# Patient Record
Sex: FEMALE | Race: WHITE | Employment: FULL TIME | URBAN - NONMETROPOLITAN AREA
[De-identification: names, ages, dates, MRNs, and addresses within clinical notes are randomized per-mention and may not be internally consistent; named-entity substitution may affect disease eponyms.]

---

## 2020-02-27 ENCOUNTER — APPOINTMENT (OUTPATIENT)
Dept: CT IMAGING | Age: 50
DRG: 201 | End: 2020-02-27
Attending: INTERNAL MEDICINE

## 2020-02-27 ENCOUNTER — HOSPITAL ENCOUNTER (INPATIENT)
Age: 50
LOS: 6 days | Discharge: HOME | DRG: 201 | End: 2020-03-04
Attending: INTERNAL MEDICINE | Admitting: INTERNAL MEDICINE

## 2020-02-27 PROBLEM — J93.83 SPONTANEOUS PNEUMOTHORAX: Status: ACTIVE | Noted: 2020-02-27

## 2020-02-27 LAB
MRSA SCREEN RT-PCR: POSITIVE
VANCOMYCIN RESISTANT ENTEROCOCCUS: NEGATIVE

## 2020-02-27 PROCEDURE — 6370000000 HC RX 637 (ALT 250 FOR IP): Performed by: NURSE PRACTITIONER

## 2020-02-27 PROCEDURE — 6360000002 HC RX W HCPCS: Performed by: NURSE PRACTITIONER

## 2020-02-27 PROCEDURE — 2709999900 HC NON-CHARGEABLE SUPPLY

## 2020-02-27 PROCEDURE — 99223 1ST HOSP IP/OBS HIGH 75: CPT | Performed by: NURSE PRACTITIONER

## 2020-02-27 PROCEDURE — APPSS60 APP SPLIT SHARED TIME 46-60 MINUTES: Performed by: PHYSICIAN ASSISTANT

## 2020-02-27 PROCEDURE — 87081 CULTURE SCREEN ONLY: CPT

## 2020-02-27 PROCEDURE — 2060000000 HC ICU INTERMEDIATE R&B

## 2020-02-27 PROCEDURE — 87500 VANOMYCIN DNA AMP PROBE: CPT

## 2020-02-27 PROCEDURE — 0W9930Z DRAINAGE OF RIGHT PLEURAL CAVITY WITH DRAINAGE DEVICE, PERCUTANEOUS APPROACH: ICD-10-PCS | Performed by: INTERNAL MEDICINE

## 2020-02-27 PROCEDURE — 71250 CT THORAX DX C-: CPT

## 2020-02-27 PROCEDURE — 2580000003 HC RX 258: Performed by: NURSE PRACTITIONER

## 2020-02-27 PROCEDURE — 94760 N-INVAS EAR/PLS OXIMETRY 1: CPT

## 2020-02-27 PROCEDURE — 87641 MR-STAPH DNA AMP PROBE: CPT

## 2020-02-27 RX ORDER — MAGNESIUM SULFATE IN WATER 40 MG/ML
2 INJECTION, SOLUTION INTRAVENOUS PRN
Status: DISCONTINUED | OUTPATIENT
Start: 2020-02-27 | End: 2020-03-04 | Stop reason: HOSPADM

## 2020-02-27 RX ORDER — SODIUM CHLORIDE 0.9 % (FLUSH) 0.9 %
10 SYRINGE (ML) INJECTION PRN
Status: DISCONTINUED | OUTPATIENT
Start: 2020-02-27 | End: 2020-03-04 | Stop reason: HOSPADM

## 2020-02-27 RX ORDER — POTASSIUM CHLORIDE 20 MEQ/1
40 TABLET, EXTENDED RELEASE ORAL PRN
Status: DISCONTINUED | OUTPATIENT
Start: 2020-02-27 | End: 2020-03-04 | Stop reason: HOSPADM

## 2020-02-27 RX ORDER — PROMETHAZINE HYDROCHLORIDE 25 MG/1
12.5 TABLET ORAL EVERY 6 HOURS PRN
Status: DISCONTINUED | OUTPATIENT
Start: 2020-02-27 | End: 2020-03-04 | Stop reason: HOSPADM

## 2020-02-27 RX ORDER — NICOTINE 21 MG/24HR
1 PATCH, TRANSDERMAL 24 HOURS TRANSDERMAL DAILY
Status: DISCONTINUED | OUTPATIENT
Start: 2020-02-27 | End: 2020-03-04 | Stop reason: HOSPADM

## 2020-02-27 RX ORDER — MULTIVITAMIN WITH IRON
100 TABLET ORAL DAILY
COMMUNITY

## 2020-02-27 RX ORDER — OXYCODONE HYDROCHLORIDE AND ACETAMINOPHEN 5; 325 MG/1; MG/1
2 TABLET ORAL EVERY 4 HOURS PRN
Status: DISCONTINUED | OUTPATIENT
Start: 2020-02-27 | End: 2020-03-04 | Stop reason: HOSPADM

## 2020-02-27 RX ORDER — POTASSIUM CHLORIDE 7.45 MG/ML
10 INJECTION INTRAVENOUS PRN
Status: DISCONTINUED | OUTPATIENT
Start: 2020-02-27 | End: 2020-03-04 | Stop reason: HOSPADM

## 2020-02-27 RX ORDER — ACETAMINOPHEN 650 MG/1
650 SUPPOSITORY RECTAL EVERY 6 HOURS PRN
Status: DISCONTINUED | OUTPATIENT
Start: 2020-02-27 | End: 2020-03-04 | Stop reason: HOSPADM

## 2020-02-27 RX ORDER — SODIUM CHLORIDE 0.9 % (FLUSH) 0.9 %
10 SYRINGE (ML) INJECTION EVERY 12 HOURS SCHEDULED
Status: DISCONTINUED | OUTPATIENT
Start: 2020-02-27 | End: 2020-03-04 | Stop reason: HOSPADM

## 2020-02-27 RX ORDER — SODIUM CHLORIDE 9 MG/ML
INJECTION, SOLUTION INTRAVENOUS CONTINUOUS
Status: DISCONTINUED | OUTPATIENT
Start: 2020-02-27 | End: 2020-02-29

## 2020-02-27 RX ORDER — OXYCODONE HYDROCHLORIDE AND ACETAMINOPHEN 5; 325 MG/1; MG/1
1 TABLET ORAL EVERY 4 HOURS PRN
Status: DISCONTINUED | OUTPATIENT
Start: 2020-02-27 | End: 2020-03-04 | Stop reason: HOSPADM

## 2020-02-27 RX ORDER — ACETAMINOPHEN 325 MG/1
650 TABLET ORAL EVERY 6 HOURS PRN
Status: DISCONTINUED | OUTPATIENT
Start: 2020-02-27 | End: 2020-03-04 | Stop reason: HOSPADM

## 2020-02-27 RX ORDER — ONDANSETRON 2 MG/ML
4 INJECTION INTRAMUSCULAR; INTRAVENOUS EVERY 6 HOURS PRN
Status: DISCONTINUED | OUTPATIENT
Start: 2020-02-27 | End: 2020-03-04 | Stop reason: HOSPADM

## 2020-02-27 RX ADMIN — HYDROMORPHONE HYDROCHLORIDE 0.5 MG: 1 INJECTION, SOLUTION INTRAMUSCULAR; INTRAVENOUS; SUBCUTANEOUS at 17:26

## 2020-02-27 RX ADMIN — Medication 10 ML: at 10:20

## 2020-02-27 RX ADMIN — HYDROMORPHONE HYDROCHLORIDE 0.5 MG: 1 INJECTION, SOLUTION INTRAMUSCULAR; INTRAVENOUS; SUBCUTANEOUS at 10:09

## 2020-02-27 RX ADMIN — HYDROMORPHONE HYDROCHLORIDE 0.5 MG: 1 INJECTION, SOLUTION INTRAMUSCULAR; INTRAVENOUS; SUBCUTANEOUS at 13:30

## 2020-02-27 RX ADMIN — OXYCODONE HYDROCHLORIDE AND ACETAMINOPHEN 2 TABLET: 5; 325 TABLET ORAL at 11:20

## 2020-02-27 RX ADMIN — OXYCODONE HYDROCHLORIDE AND ACETAMINOPHEN 2 TABLET: 5; 325 TABLET ORAL at 15:55

## 2020-02-27 RX ADMIN — HYDROMORPHONE HYDROCHLORIDE 0.25 MG: 1 INJECTION, SOLUTION INTRAMUSCULAR; INTRAVENOUS; SUBCUTANEOUS at 20:38

## 2020-02-27 SDOH — HEALTH STABILITY: MENTAL HEALTH: HOW OFTEN DO YOU HAVE A DRINK CONTAINING ALCOHOL?: NEVER

## 2020-02-27 ASSESSMENT — PAIN DESCRIPTION - ORIENTATION
ORIENTATION: RIGHT;MID

## 2020-02-27 ASSESSMENT — PAIN DESCRIPTION - PROGRESSION
CLINICAL_PROGRESSION: NOT CHANGED
CLINICAL_PROGRESSION: GRADUALLY IMPROVING

## 2020-02-27 ASSESSMENT — PAIN SCALES - GENERAL
PAINLEVEL_OUTOF10: 6
PAINLEVEL_OUTOF10: 7
PAINLEVEL_OUTOF10: 6
PAINLEVEL_OUTOF10: 7
PAINLEVEL_OUTOF10: 10
PAINLEVEL_OUTOF10: 5
PAINLEVEL_OUTOF10: 8
PAINLEVEL_OUTOF10: 10
PAINLEVEL_OUTOF10: 8
PAINLEVEL_OUTOF10: 9
PAINLEVEL_OUTOF10: 7

## 2020-02-27 ASSESSMENT — PAIN - FUNCTIONAL ASSESSMENT
PAIN_FUNCTIONAL_ASSESSMENT: ACTIVITIES ARE NOT PREVENTED
PAIN_FUNCTIONAL_ASSESSMENT: PREVENTS OR INTERFERES SOME ACTIVE ACTIVITIES AND ADLS
PAIN_FUNCTIONAL_ASSESSMENT: PREVENTS OR INTERFERES SOME ACTIVE ACTIVITIES AND ADLS
PAIN_FUNCTIONAL_ASSESSMENT: INTOLERABLE, UNABLE TO DO ANY ACTIVE OR PASSIVE ACTIVITIES

## 2020-02-27 ASSESSMENT — PAIN DESCRIPTION - DESCRIPTORS
DESCRIPTORS: CONSTANT;PRESSURE
DESCRIPTORS: CONSTANT
DESCRIPTORS: CONSTANT;PRESSURE
DESCRIPTORS: CONSTANT;ACHING;PRESSURE

## 2020-02-27 ASSESSMENT — PAIN DESCRIPTION - PAIN TYPE
TYPE: ACUTE PAIN

## 2020-02-27 ASSESSMENT — PAIN DESCRIPTION - FREQUENCY
FREQUENCY: CONTINUOUS

## 2020-02-27 ASSESSMENT — PAIN DESCRIPTION - LOCATION
LOCATION: CHEST

## 2020-02-27 ASSESSMENT — PAIN DESCRIPTION - ONSET
ONSET: SUDDEN
ONSET: ON-GOING

## 2020-02-27 NOTE — PROGRESS NOTES
Patient looking forward to eating a meal on a general food diet.  Blayne Orr, [facility], SN.

## 2020-02-27 NOTE — PROGRESS NOTES
Pharmacy Medication History Note      List of current medications patient is taking is complete. Source of information: patient    Changes made to medication list:  Medications removed (include reason, ex. therapy complete or physician discontinued): None     Medications added/doses adjusted: Added vitamin B-6 100 mg, patient states she takes 1 tablet daily     Other notes (ex. Recent course of antibiotics, Coumadin dosing):  Patient states she does not take any medications at home except for the vitamin B-6  Denies use of other OTC or herbal medications.       Allergies reviewed      Electronically signed by Yoan Bravo on 2/27/2020 at 9:48 AM

## 2020-02-27 NOTE — LETTER
[facility] STEPDOWN TELEMETRY 4K  [address] [facility] [address]  Phone: [phone]             March 4, 2020    Patient: Jermaine Simon   YOB: 1970   Date of Visit: 2/27/2020       To Whom It May Concern:    Jermaine Simon was seen and treated in our facility  beginning 2/27/2020 until 3/4/2020.        Sincerely,       Ladonna Tan RN         Signature:__________________________________

## 2020-02-27 NOTE — CARE COORDINATION
2/27/20, 3:23 PM  DISCHARGE PLANNING EVALUATION:    Catia Jalloh       Admitted from: [facility] 2/27/2020/ [address] day: 0   Location: [facility]09/009-A Reason for admit: Spontaneous pneumothorax [J93.83] Status: IP  Admit order signed?: yes  PMH:  has a past medical history of Asthma, Disease of blood and blood forming organ, History of blood transfusion, and Ptosis. Procedure:   2/27 CT Chest  1. Large bore chest tube right side. Small less than 5% pneumothorax right side. 2. Extensive pneumomediastinum. Relatively severe subcutaneous emphysema on the right side of the chest laterally extending anteriorly and posteriorly. Mild bibasilar atelectasis/pneumonia. 3. Note that the large bore chest tube appears to extend into the lung parenchyma posteriorly with some mild adjacent atelectasis or possibly tiny amount of associated focal hemorrhage along the tube tract. Medications:  Scheduled Meds:   sodium chloride flush  10 mL Intravenous 2 times per day    nicotine  1 patch Transdermal Daily     Continuous Infusions:   sodium chloride 50 mL/hr at 02/27/20 1021      Pertinent Info/Orders/Treatment Plan:  Client admitted with spontaneous pneumothorax, treated with IVF, Oxygen 1L; CTS following  Diet: DIET CLEAR LIQUID;   Smoking status:  reports that she quit smoking about 3 weeks ago. Her smoking use included cigarettes. She has a 32.00 pack-year smoking history. She does not have any smokeless tobacco history on file.    PCP: Renetta Haney MD  Readmission 30 days or less: none  Readmission Risk Score: 6%    Discharge Planning Evaluation  Current Residence:  Private Residence  Living Arrangements:  Family Members   Support Systems:  Children, Family Members  Current Services PTA:     Potential Assistance Needed:  N/A  Potential Assistance Purchasing Medications:  No  Does patient want to participate in local refill/ meds to beds program?  No  Type of Home Care Services:  None  Patient expects to be

## 2020-02-27 NOTE — DISCHARGE SUMMARY
CT/CV Surgery Consult Note    2020 10:33 AM  Surgeon:  Dr. Susana Lawson     Reason for Consult: Pneumothorax    CC:   SOB    HPI:    Ms. Patsy Delarosa  is a 47year old female who presents from [facility] for evaluation and treatment of right sided spontaneous pneumothorax. She has a hx of 2 spontaneous pneumothorax within the last month. She currently is complaining of right sided chest pain uncontrolled with Dilaudid. She currently tells me her breathing is stable and unchanged since this AM. She stopped smoking last about 3 weeks ago. Prior to that she was smoking a pack a day. Vital Signs: /79   Pulse 72   Temp 97.8 °F (36.6 °C) (Oral)   Resp 18   Ht 5' 2\" (1.575 m)   SpO2 97%    Temp (24hrs), Av.8 °F (36.6 °C), Min:97.8 °F (36.6 °C), Max:97.8 °F (36.6 °C)      PULSE OXIMETRY RANGE: SpO2  Av %  Min: 97 %  Max: 97 %    SUPPLEMENTAL O2: O2 Flow Rate (L/min): 2 L/min     Labs:   CBC:   No results for input(s): WBC, HGB, HCT, MCV, PLT, APTT, PROTIME, INR in the last 72 hours. BMP: No results for input(s): NA, K, CL, CO2, PHOS, BUN, CREATININE, MG, POCGLU in the last 72 hours. Invalid input(s): CA  Last HgA1C: No results found for: LABA1C    Imaging:  CXR: I have reviewed the CXR image. Patient Name: Marjorie Kimball MR #: [id number]   YOB: 1970 Gender: F   Service Type: ER Pt. Type: E   Ordering Phys: Toyin Albarran Account #: [de-identified]   Admitting Phys: Accession #: [id number]   Family Phys:    Additional CC Phys:       MEDICAL IMAGING REPORT           EXAM: CHEST AP or PA PORTABLE     Exam Date: 2020 Exam Time: 05:55:19     CLINICAL HISTORY: chest tube placement     [facility] ACQUIRED HISTORY: chest tube insertion     EXAMINATION:   ONE XRAY VIEW OF THE CHEST     2020 5:55 am     COMPARISON:   Same day chest radiograph from 2 hours prior     HISTORY:   Reason for exam: : chest tube placement Note: 2 - TECH HISTORY chest tube   insertion     FINDINGS:   Status post right chest tube placement with persistent right-sided   pneumothorax. Direct size comparison is limited given supine positioning. Slight increase in the previously seen right neck and chest wall subcutaneous   emphysema. No focal consolidation or pleural effusion. The cardiac   silhouette and osseous structures are stable. IMPRESSION:   1. Persistent right-sided pneumothorax status post chest tube placement. 2. Increased right neck and chest wall subcutaneous emphysema. D/T: 2/27/2020 6:20:31 AM / /     Interpreting Provider:   Electronically signed by August Polacno MD on 2/27/2020 6:29:14 AM     Patient: Danilo Torres MRN: [id number] Date of Service: 2/27/2020        CT chest w/o IV contrast: I have reviewed the CT image. 02/13   Patient Name: Cas Fierro MR #: [id number]   YOB: 1970 Gender: Female   Service Type: MEDICAL GENERAL Pt. Type: In Patient   Ordering Phys: Francheska Nelson DO Account #: [de-identified]   Admitting Phys: Accession #: [id number]   Family Phys: Additional CC Phys:   MEDICAL IMAGING REPORT       EXAM: CT CHEST W/ IV CONTRAST   Exam Date: 02/13/2020 Exam Time: 10:48 AM   CLINICAL HISTORY: Pneumothorax, crepitus sp chest tube   [facility] ACQUIRED HISTORY: had rt side spontaneous pneumo, rt sided chest tube has been removed, subcutaneous air to face and now left side of body   COMPARISON: 2-   TECHNIQUE: CT of the chest was performed with administration of intravenous contrast. Multiplanar reformatted images are provided for review. This exam was performed according to our department dose optimization protocol, which includes automated exposure control, adjustment of the mA and/or kV according to patient size and/or use of iterative reconstruction technique. DLP: 117     FINDINGS:     A very small tiny pneumothorax amount to less than 5% involving the right lung base along the anterior aspect noted. No shift of midline.      The curvilinear lucency in the right apex noted on the chest radiographs appears to be related to a superimposed density rather than a pneumothorax in the right apex. No shift of mediastinum. Extensive subcutaneous emphysema again noted. Mild pneumomediastinum also noted, unchanged since 2-. OPTIRAY 320 100ML was used with 75 ml injected and 25 ml discarded. [employer] Tech ID: [id number]   DLP : 117   Transcribed by: [id number] Transcription Date: 02/13/2020 Transcription Time: 11:35 AM   Dictated by: Vijay Suarez, Dictation Date: 02/13/2020 Dictation Time: 10:55 AM   Electronically Signed by: Vijay Suarez on 02/13/2020 at 12:56 PM            Scheduled Meds:    sodium chloride flush  10 mL Intravenous 2 times per day    nicotine  1 patch Transdermal Daily         PastMedical History:  Mago Thurman  has no past medical history on file. Past Surgical History:  The patient  has no past surgical history on file. Allergies: The patient has no allergies on file. Family History: This patient's family history is not on file. Social History:  Mago Thurman      ROS:  Constitutional: Negative for activity change, chills, fatigue, fever and unexpected weight change. HENT: Negative for congestion, facial swelling, sore throat, and changes in voice. Eyes: Negative for photophobia, redness, itching and visual disturbance. Respiratory: Positive for SOB   Cardiovascular: Positive for chest pain. Negative for  palpitations and leg swelling. Gastrointestinal: Negative for abdominal distention, constipation, nausea and vomiting. Endocrine: Negative for cold intolerance, heat intolerance, polyphagia and polyuria. Musculoskeletal: Negative for arthralgias, gait problem, and myalgias. Skin: Negative for color change, rash and wound. Allergic/Immunologic: Negative for food allergies and immunocompromised state. Neurological: Negative for dizziness, tremors, speech difficulty, weakness, numbness and headaches.    Hematological: Negative for adenopathy. Does not bruise/bleed easily. Psychiatric/Behavioral: Negative for agitation, confusion, and dysphoric mood. Physical Exam:   General appearance:  No apparent distress, appears stated age and cooperative. HEENT:  Normal cephalic, atraumatic without obvious deformity. Conjunctivae/corneas clear. Neck: Supple, with full range of motion. No jugular venous distention. Trachea midline. Respiratory:  Diminished lung sounds on the right. Cardiovascular:  Regular rate and rhythm with normal S1/S2 without murmurs, rubs or gallops. Abdomen: Soft, non-tender, non-distended with normal bowel sounds. Musculoskeletal:  No clubbing, cyanosis or edema bilaterally. Full range of motion without deformity. Skin: Skin color, texture, turgor normal.  No rashes or lesions. Neurologic:  Neurovascularly intact without any focal sensory/motor deficits. Psychiatric:  Alert and oriented, thought content appropriate, normal insight. Active Problem List  Patient Active Problem List   Diagnosis    Spontaneous pneumothorax       Assessment:   Recurrent right sided spontaneous pneumothorax    Plan: 2/27/20  1. CT chest wo contrast  2. Chest tube to suction    The plan of care was discussed in detail with Dr. Beatriz cardoso     Issues discussed in detail with the patient, who understands and has no further questions. Time spent with patient: 80 minutes, of which more than 50% was spent counseling/coordinating the patient's care.     Alysia Friend PA-C

## 2020-02-27 NOTE — PROGRESS NOTES
Patient is a 47year old patient from [facility] ED with Dr Blaze Barraza transferring. Patient has a history of 2 spontaneous pneumothorax. Patient arrived with c/o Right chest wall and Right scapular pain, in no distress. CXR shows moderate pneumothorax . Extensive subcutaneous emphysema right chest, right scapula and right arm. Dr Blaze Barraza placed a chest tube to water seal. Everything appears to be working properly with the chest tube, however 2nd CXR showed a persistent pneumo, no tension. Dr Blaze Barraza states that it may possibly be a loculated area that may need drained since it still shows persistent pneumothorax after CT placement. Besides severe pain, the patient is in no distress. VSS as follows: /74, temp 97F, HR 93, RR 18 and is 100% oxygen saturations on room air. Dr Carol Robles consulted and asked for hospitalist to admit. Patient will go to 4K12 under Dr Bob Craven with diagnosis of Spontaneous Pneumothorax.

## 2020-02-27 NOTE — CONSULTS
CT/CV Surgery Consult Note    2020 12:26 PM  Surgeon:  Dr. Beatriz cardoso     Reason for Consult: Pneumothorax    CC:   SOB    HPI:    Ms. Veronica Pollard  is a 47year old female who presents from [facility] for evaluation and treatment of right sided spontaneous pneumothorax. She has a hx of 2 spontaneous pneumothorax within the last month. She currently is complaining of right sided chest pain uncontrolled with Dilaudid. She currently tells me her breathing is stable and unchanged since this AM. She stopped smoking last about 3 weeks ago. Prior to that she was smoking a pack a day. Vital Signs: /78   Pulse 70   Temp 97.5 °F (36.4 °C) (Oral)   Resp 18   Ht 5' 2\" (1.575 m)   SpO2 99%    Temp (24hrs), Av.7 °F (36.5 °C), Min:97.5 °F (36.4 °C), Max:97.8 °F (36.6 °C)      PULSE OXIMETRY RANGE: SpO2  Av %  Min: 97 %  Max: 99 %    SUPPLEMENTAL O2: O2 Flow Rate (L/min): 1 L/min     Labs:   CBC:   No results for input(s): WBC, HGB, HCT, MCV, PLT, APTT, PROTIME, INR in the last 72 hours. BMP: No results for input(s): NA, K, CL, CO2, PHOS, BUN, CREATININE, MG, POCGLU in the last 72 hours. Invalid input(s): CA  Last HgA1C: No results found for: LABA1C    Imaging:  CXR: I have reviewed the CXR image. Patient Name: Taryn Koenig MR #: [id number]   YOB: 1970 Gender: F   Service Type: ER Pt. Type: E   Ordering Phys: Fred Jason Account #: [de-identified]   Admitting Phys: Accession #: [id number]   Family Phys:    Additional CC Phys:       MEDICAL IMAGING REPORT           EXAM: CHEST AP or PA PORTABLE     Exam Date: 2020 Exam Time: 05:55:19     CLINICAL HISTORY: chest tube placement     [facility] ACQUIRED HISTORY: chest tube insertion     EXAMINATION:   ONE XRAY VIEW OF THE CHEST     2020 5:55 am     COMPARISON:   Same day chest radiograph from 2 hours prior     HISTORY:   Reason for exam: : chest tube placement Note: 2 - TECH HISTORY chest tube   insertion     FINDINGS:   Status post right chest tube placement with persistent right-sided   pneumothorax. Direct size comparison is limited given supine positioning. Slight increase in the previously seen right neck and chest wall subcutaneous   emphysema. No focal consolidation or pleural effusion. The cardiac   silhouette and osseous structures are stable. IMPRESSION:   1. Persistent right-sided pneumothorax status post chest tube placement. 2. Increased right neck and chest wall subcutaneous emphysema. D/T: 2/27/2020 6:20:31 AM / /     Interpreting Provider:   Electronically signed by August Polanco MD on 2/27/2020 6:29:14 AM     Patient: Danilo Torres MRN: [id number] Date of Service: 2/27/2020        CT chest w/o IV contrast: I have reviewed the CT image. 02/13   Patient Name: Cas Fierro MR #: [id number]   YOB: 1970 Gender: Female   Service Type: MEDICAL GENERAL Pt. Type: In Patient   Ordering Phys: Francheska Nelson DO Account #: [de-identified]   Admitting Phys: Accession #: [id number]   Family Phys: Additional CC Phys:   MEDICAL IMAGING REPORT       EXAM: CT CHEST W/ IV CONTRAST   Exam Date: 02/13/2020 Exam Time: 10:48 AM   CLINICAL HISTORY: Pneumothorax, crepitus sp chest tube   [facility] ACQUIRED HISTORY: had rt side spontaneous pneumo, rt sided chest tube has been removed, subcutaneous air to face and now left side of body   COMPARISON: 2-   TECHNIQUE: CT of the chest was performed with administration of intravenous contrast. Multiplanar reformatted images are provided for review. This exam was performed according to our department dose optimization protocol, which includes automated exposure control, adjustment of the mA and/or kV according to patient size and/or use of iterative reconstruction technique. DLP: 117     FINDINGS:     A very small tiny pneumothorax amount to less than 5% involving the right lung base along the anterior aspect noted. No shift of midline.      The curvilinear lucency in the right apex noted on the chest radiographs appears to be related to a superimposed density rather than a pneumothorax in the right apex. No shift of mediastinum. Extensive subcutaneous emphysema again noted. Mild pneumomediastinum also noted, unchanged since 2-. OPTIRAY 320 100ML was used with 75 ml injected and 25 ml discarded. Performing Tech ID: [id number]   DLP : 117   Transcribed by: [id number] Transcription Date: 02/13/2020 Transcription Time: 11:35 AM   Dictated by: Andra Fraser, Dictation Date: 02/13/2020 Dictation Time: 10:55 AM   Electronically Signed by: Andra Fraser on 02/13/2020 at 12:56 PM            Scheduled Meds:    sodium chloride flush  10 mL Intravenous 2 times per day    nicotine  1 patch Transdermal Daily         PastMedical History:  Delaine Bence  has no past medical history on file. Past Surgical History:  The patient  has no past surgical history on file. Allergies: The patient has no allergies on file. Family History: This patient's family history is not on file. Social History:  Delaine Bence      ROS:  Constitutional: Negative for activity change, chills, fatigue, fever and unexpected weight change. HENT: Negative for congestion, facial swelling, sore throat, and changes in voice. Eyes: Negative for photophobia, redness, itching and visual disturbance. Respiratory: Positive for SOB   Cardiovascular: Positive for chest pain. Negative for  palpitations and leg swelling. Gastrointestinal: Negative for abdominal distention, constipation, nausea and vomiting. Endocrine: Negative for cold intolerance, heat intolerance, polyphagia and polyuria. Musculoskeletal: Negative for arthralgias, gait problem, and myalgias. Skin: Negative for color change, rash and wound. Allergic/Immunologic: Negative for food allergies and immunocompromised state. Neurological: Negative for dizziness, tremors, speech difficulty, weakness, numbness and headaches.    Hematological: Negative for

## 2020-02-27 NOTE — H&P
information on record. Social History:   Smokes 1 pack/day for a long time and stopped 3 weeks ago    Family History:      Positive as follows: Patient denies any significant medical history    REVIEW OF SYSTEMS:     Constitutional: ROS: negative for - chills or fever  Head: no headache, no head injury, no migraine   Eyes ROS: denies blurred/double vision  Ears ROS: no hearing difficulty, no tinnitus  Mouth and Throat ROS: no ulceration, dysphagia, dental caries  Psychological ROS: no depression, no anxiety, no panic attacks, denies suicide/homicide ideation  Endocrine ROS: denies polyuria, polydypsia, no heat or cold intolerance  Respiratory ROS: positive for - cough, pleuritic pain and shortness of breath  Cardiovascular ROS: positive for - chest pain and shortness of breath  Gastrointestinal ROS: no abdominal pain, change in bowel habits, or black or bloody stools  Genito-Urinary ROS: denies dysuria, frequency, urgency; denies hematuria  Musculoskeletal ROS: negative  Neurological ROS: no syncope, no seizures, no numbness or tingling of hands, no numbness or tingling of feet, no paresis  Dermatology: no skin rash, no eczema  Endocrine: no polyuria, polydypsia, no heat/cold intolerance  Hematology: denies bruising easily, denies bleeding problems, denies clotting disorders    PHYSICAL EXAM:    /79   Pulse 72   Temp 97.8 °F (36.6 °C) (Oral)   Resp 18   Ht 5' 2\" (1.575 m)   SpO2 97%     General appearance:  No apparent distress, appears stated age and cooperative. HEENT:  Normal cephalic, atraumatic without obvious deformity. Pupils equal, round, and reactive to light. Conjunctivae/corneas clear. Neck: Supple, with full range of motion. No jugular venous distention. Trachea midline. Respiratory:  Normal respiratory effort. Diminished to auscultation right side,  bilaterally without Rales/Wheezes/Rhonchi.   Cardiovascular:  Regular rate and rhythm with normal S1/S2 without murmurs, rubs or

## 2020-02-28 ENCOUNTER — APPOINTMENT (OUTPATIENT)
Dept: GENERAL RADIOLOGY | Age: 50
DRG: 201 | End: 2020-02-28
Attending: INTERNAL MEDICINE

## 2020-02-28 LAB
ANION GAP SERPL CALCULATED.3IONS-SCNC: 8 MEQ/L (ref 8–16)
BASOPHILS # BLD: 0.1 %
BASOPHILS ABSOLUTE: 0 THOU/MM3 (ref 0–0.1)
BUN BLDV-MCNC: 13 MG/DL (ref 7–22)
CALCIUM SERPL-MCNC: 8.2 MG/DL (ref 8.5–10.5)
CHLORIDE BLD-SCNC: 108 MEQ/L (ref 98–111)
CO2: 23 MEQ/L (ref 23–33)
CREAT SERPL-MCNC: 0.4 MG/DL (ref 0.4–1.2)
EOSINOPHIL # BLD: 2.8 %
EOSINOPHILS ABSOLUTE: 0.2 THOU/MM3 (ref 0–0.4)
ERYTHROCYTE [DISTWIDTH] IN BLOOD BY AUTOMATED COUNT: 14.7 % (ref 11.5–14.5)
ERYTHROCYTE [DISTWIDTH] IN BLOOD BY AUTOMATED COUNT: 55.5 FL (ref 35–45)
GFR SERPL CREATININE-BSD FRML MDRD: > 90 ML/MIN/1.73M2
GLUCOSE BLD-MCNC: 121 MG/DL (ref 70–108)
HCT VFR BLD CALC: 33.4 % (ref 37–47)
HEMOGLOBIN: 10.2 GM/DL (ref 12–16)
IMMATURE GRANS (ABS): 0.04 THOU/MM3 (ref 0–0.07)
IMMATURE GRANULOCYTES: 0.6 %
LYMPHOCYTES # BLD: 12 %
LYMPHOCYTES ABSOLUTE: 0.9 THOU/MM3 (ref 1–4.8)
MAGNESIUM: 1.8 MG/DL (ref 1.6–2.4)
MCH RBC QN AUTO: 31.3 PG (ref 26–33)
MCHC RBC AUTO-ENTMCNC: 30.5 GM/DL (ref 32.2–35.5)
MCV RBC AUTO: 102.5 FL (ref 81–99)
MONOCYTES # BLD: 2.5 %
MONOCYTES ABSOLUTE: 0.2 THOU/MM3 (ref 0.4–1.3)
NUCLEATED RED BLOOD CELLS: 0 /100 WBC
PLATELET # BLD: 267 THOU/MM3 (ref 130–400)
PMV BLD AUTO: 8.8 FL (ref 9.4–12.4)
POTASSIUM REFLEX MAGNESIUM: 3.5 MEQ/L (ref 3.5–5.2)
RBC # BLD: 3.26 MILL/MM3 (ref 4.2–5.4)
SEG NEUTROPHILS: 82 %
SEGMENTED NEUTROPHILS ABSOLUTE COUNT: 6 THOU/MM3 (ref 1.8–7.7)
SODIUM BLD-SCNC: 139 MEQ/L (ref 135–145)
WBC # BLD: 7.3 THOU/MM3 (ref 4.8–10.8)

## 2020-02-28 PROCEDURE — 71045 X-RAY EXAM CHEST 1 VIEW: CPT

## 2020-02-28 PROCEDURE — 6370000000 HC RX 637 (ALT 250 FOR IP): Performed by: NURSE PRACTITIONER

## 2020-02-28 PROCEDURE — APPSS30 APP SPLIT SHARED TIME 16-30 MINUTES: Performed by: PHYSICIAN ASSISTANT

## 2020-02-28 PROCEDURE — 80048 BASIC METABOLIC PNL TOTAL CA: CPT

## 2020-02-28 PROCEDURE — 6360000002 HC RX W HCPCS: Performed by: NURSE PRACTITIONER

## 2020-02-28 PROCEDURE — 83735 ASSAY OF MAGNESIUM: CPT

## 2020-02-28 PROCEDURE — 99232 SBSQ HOSP IP/OBS MODERATE 35: CPT | Performed by: NURSE PRACTITIONER

## 2020-02-28 PROCEDURE — 2060000000 HC ICU INTERMEDIATE R&B

## 2020-02-28 PROCEDURE — 85025 COMPLETE CBC W/AUTO DIFF WBC: CPT

## 2020-02-28 PROCEDURE — 2709999900 HC NON-CHARGEABLE SUPPLY

## 2020-02-28 PROCEDURE — 36415 COLL VENOUS BLD VENIPUNCTURE: CPT

## 2020-02-28 RX ADMIN — POTASSIUM CHLORIDE 40 MEQ: 1500 TABLET, EXTENDED RELEASE ORAL at 07:09

## 2020-02-28 RX ADMIN — OXYCODONE HYDROCHLORIDE AND ACETAMINOPHEN 2 TABLET: 5; 325 TABLET ORAL at 19:54

## 2020-02-28 RX ADMIN — HYDROMORPHONE HYDROCHLORIDE 0.5 MG: 1 INJECTION, SOLUTION INTRAMUSCULAR; INTRAVENOUS; SUBCUTANEOUS at 21:42

## 2020-02-28 RX ADMIN — HYDROMORPHONE HYDROCHLORIDE 0.25 MG: 1 INJECTION, SOLUTION INTRAMUSCULAR; INTRAVENOUS; SUBCUTANEOUS at 01:15

## 2020-02-28 RX ADMIN — HYDROMORPHONE HYDROCHLORIDE 0.5 MG: 1 INJECTION, SOLUTION INTRAMUSCULAR; INTRAVENOUS; SUBCUTANEOUS at 05:07

## 2020-02-28 RX ADMIN — OXYCODONE HYDROCHLORIDE AND ACETAMINOPHEN 1 TABLET: 5; 325 TABLET ORAL at 07:11

## 2020-02-28 RX ADMIN — OXYCODONE HYDROCHLORIDE AND ACETAMINOPHEN 2 TABLET: 5; 325 TABLET ORAL at 13:26

## 2020-02-28 RX ADMIN — OXYCODONE HYDROCHLORIDE AND ACETAMINOPHEN 2 TABLET: 5; 325 TABLET ORAL at 02:30

## 2020-02-28 RX ADMIN — HYDROMORPHONE HYDROCHLORIDE 0.5 MG: 1 INJECTION, SOLUTION INTRAMUSCULAR; INTRAVENOUS; SUBCUTANEOUS at 14:45

## 2020-02-28 ASSESSMENT — PAIN DESCRIPTION - FREQUENCY
FREQUENCY: CONTINUOUS

## 2020-02-28 ASSESSMENT — PAIN SCALES - GENERAL
PAINLEVEL_OUTOF10: 10
PAINLEVEL_OUTOF10: 0
PAINLEVEL_OUTOF10: 0
PAINLEVEL_OUTOF10: 6
PAINLEVEL_OUTOF10: 7
PAINLEVEL_OUTOF10: 8
PAINLEVEL_OUTOF10: 7
PAINLEVEL_OUTOF10: 9
PAINLEVEL_OUTOF10: 0
PAINLEVEL_OUTOF10: 5
PAINLEVEL_OUTOF10: 9
PAINLEVEL_OUTOF10: 10

## 2020-02-28 ASSESSMENT — PAIN DESCRIPTION - PAIN TYPE
TYPE: ACUTE PAIN

## 2020-02-28 ASSESSMENT — PAIN DESCRIPTION - PROGRESSION
CLINICAL_PROGRESSION: NOT CHANGED

## 2020-02-28 ASSESSMENT — PAIN DESCRIPTION - ONSET
ONSET: ON-GOING

## 2020-02-28 ASSESSMENT — PAIN DESCRIPTION - DESCRIPTORS
DESCRIPTORS: ACHING

## 2020-02-28 ASSESSMENT — PAIN DESCRIPTION - LOCATION
LOCATION: CHEST

## 2020-02-28 ASSESSMENT — PAIN DESCRIPTION - ORIENTATION
ORIENTATION: RIGHT

## 2020-02-28 ASSESSMENT — PAIN - FUNCTIONAL ASSESSMENT
PAIN_FUNCTIONAL_ASSESSMENT: PREVENTS OR INTERFERES SOME ACTIVE ACTIVITIES AND ADLS
PAIN_FUNCTIONAL_ASSESSMENT: PREVENTS OR INTERFERES SOME ACTIVE ACTIVITIES AND ADLS

## 2020-02-28 NOTE — PROGRESS NOTES
Hospitalist Progress Note      Patient:  Amol Rangel    Unit/Bed:4K-09/009-A  YOB: 1970  MRN: [id number]   Acct: [de-identified]   PCP: Tom Elliott MD  Date of Admission: 2/27/2020    Assessment/Plan:    1. Spontaneous large right pneumothorax: Chest tube was placed at [facility].  CXR on 2/28 reports no acute pneumothorax with coarse lung markings and prominent subcutaneous emphysema along the right thorax. Continue chest tube management repeat CXR in the morning. CT surgery following, appreciate assistance. 2. Extensive subcutaneous emphysema: CT of the chest performed on 2/27 reports relatively severe subcutaneous emphysema on the right side of the chest laterally extending anteriorly and posteriorly. Continue chest tube management, repeat CXR in the morning. CT surgery following. 3. Hypokalemia: Resolved. Current potassium 3.5.  4. Acute macrocytic anemia: Current H&H 10.2/33.4 with an MCV of 102. 5. Probably secondary to above problems. No overt signs of bleeding at this time. Continue to monitor for changes, recheck CBC vitamin B12, and folate in the morning. 5. Remote smoker: Patient reports quitting smoking approximately 3 weeks ago. She was educated on the importance of smoking cessation and was receptive to the education. Currently denies NRT. Chief Complaint: Right-sided chest pain    Initial H and P:-    Amol Rangel is a 50-year-old  female, reformed smoker, with a medical history of asthma and ptosis. Patient presented to [facility] with complaints of right-sided chest pain after she was initially seen at [facility] this morning. She was initially seen at [address] [place] [address] for right-sided chest pain two spontaneous pneumothorax 2 weeks ago.   Patient reported that she had a chest tube at that time and presented today with complaints of right-sided chest

## 2020-02-28 NOTE — FLOWSHEET NOTE
02/28/20 0403   Provider Notification   Reason for Communication Evaluate   Provider Name Jose Maria Stallings   Provider Notification Physician Assistant   Method of Communication Secure Message   Response Waiting for response   Notification Time [address] [facility] notified that patient is still unable to take deep breaths and has 10/10 pain. Respiratory assessment remains unchanged. Patient states \"this is my third chest tube, I know this is not right.  Something is changed, I think my lung is collapsing again\"

## 2020-02-28 NOTE — PROGRESS NOTES
9717 - pt complaining that she \"can't take a deep breath\" after getting up to the bedside commode. CT site remains clean, dry, intact. No air leak, crepitus still present. Lung sounds diminished but heard throughout. Chest rise symmetrical with no tracheal deviation. Pt does not appear in any respiratory distress. RR 18-20. SpO2 96% on 1L NC. Complains of 10/10 pain at R CT site. Tender on palpation. Pain medication administered at this time. Pt is extremely anxious. Pt instructed to call with any increased shortness of breath or pain. Will continue to monitor.

## 2020-02-28 NOTE — PLAN OF CARE
Problem: Pain:  Goal: Pain level will decrease  Description  Pain level will decrease  Outcome: Ongoing  Note:   Pain Assessment: 0-10  Pain Level: 6   Patient's Stated Pain Goal: 5   Is pain goal met at this time? No     Non-Pharmaceutical Pain Intervention(s): Repositioned, Rest    Goal: Control of acute pain  Description  Control of acute pain  Outcome: Ongoing  Goal: Control of chronic pain  Description  Control of chronic pain  Outcome: Ongoing     Problem: Infection:  Goal: Will remain free from infection  Description  Will remain free from infection  Outcome: Ongoing  Note:   Patient shows no signs of infection this shift. Problem: Daily Care:  Goal: Daily care needs are met  Description  Daily care needs are met  Outcome: Ongoing  Note:   Patient is refusing to move and not wanting to get up and ambulate. Needs encouragement to participate in care. Problem: Skin Integrity:  Goal: Skin integrity will stabilize  Description  Skin integrity will stabilize  Outcome: Ongoing  Note:   No noted skin issues. Patient does have a right chest tube to suction. Dressing ARTEMIO. Problem: Discharge Planning:  Goal: Patients continuum of care needs are met  Description  Patients continuum of care needs are met  Outcome: Ongoing  Note:   No discharge plans this shift. Care plan reviewed with patient. Patient verbalize understanding of the plan of care and contribute to goal setting.

## 2020-02-28 NOTE — CARE COORDINATION
2/28/20, 11:27 AM    DISCHARGE ON [address] [employer] [place] day: 1  Location: [facility]09/009- Reason for admit: Spontaneous pneumothorax [J93.83]   Procedure:   2/27 CT Chest  1. Large bore chest tube right side. Small less than 5% pneumothorax right side. 2. Extensive pneumomediastinum. Relatively severe subcutaneous emphysema on the right side of the chest laterally extending anteriorly and posteriorly. Mild bibasilar atelectasis/pneumonia. 3. Note that the large bore chest tube appears to extend into the lung parenchyma posteriorly with some mild adjacent atelectasis or possibly tiny amount of associated focal hemorrhage along the tube tract. 2/28 CXR  1. Visualized right-sided chest tube. No acute pneumothorax. 2. Coarse lung markings. 3. Prominent subcutaneous emphysema along the right thorax.    Treatment Plan of Care: CTS following, right chest tube output = 112 ml/24h; monitor  Barriers to Discharge: plans home with family  PCP: Oralia Gaucher, MD  Readmission Risk Score: 8%  Patient Goals/Plan/Treatment Preferences: met with client, denied needs as plans home with daughter Merissa Nelson as PTA

## 2020-02-28 NOTE — PROGRESS NOTES
CT/CV Surgery Progress Note    2020 8:47 AM  Surgeon:  Dr. Charisse Hdz     Subjective:  Ms. Jena Cruz is resting comfortably in bed on 1L NC O2 sat 96%. She is alert, and in no acute distress. Chest tube placed for spontaneous pneumothorax with history of 2 previous spontaneous pneumothorax. Chest tube output: 25cc past shift, 112cc 24 hours. No air leak. Pt denies chest pressure, SOB, fever,chills, N/V/D. Vital Signs: /61   Pulse 64   Temp 97.7 °F (36.5 °C) (Oral)   Resp 18   Ht 5' 2\" (1.575 m)   Wt 120 lb (54.4 kg)   SpO2 96%   BMI 21.95 kg/m²    Temp (24hrs), Av.7 °F (36.5 °C), Min:97.5 °F (36.4 °C), Max:97.8 °F (36.6 °C)      PULSE OXIMETRY RANGE: SpO2  Av.2 %  Min: 96 %  Max: 99 %     Labs:   CBC:     Recent Labs     20  0611   WBC 7.3   HGB 10.2*   HCT 33.4*   .5*        BMP:   Recent Labs     20  0611      K 3.5      CO2 23   BUN 13   CREATININE 0.4   MG 1.8     Last HgA1C: No results found for: LABA1C    Imaging:  CXR: I have reviewed the CXR image. 1. Visualized right-sided chest tube. No acute pneumothorax. 2. Coarse lung markings. 3. Prominent subcutaneous emphysema along the right thorax. Intake/Output Summary (Last 24 hours) at 2020 0800  Last data filed at 2020 0513  Gross per 24 hour   Intake 1346.86 ml   Output 1212 ml   Net 134.86 ml       Scheduled Meds:    sodium chloride flush  10 mL Intravenous 2 times per day    nicotine  1 patch Transdermal Daily       ROS: All neg unless specifically mentioned in subjective section.      Exam:  General Appearance: alert ,conversing, in no acute distress  Cardiovascular: normal rate, regular rhythm, normal S1 and S2, no murmurs, rubs, clicks, or gallops  Pulmonary/Chest: Subcutaneous emphysema right chest.  Lungs clear to auscultation bilaterally- no wheezes, rales or rhonchi, normal air movement, no respiratory distress  Neurological: alert, oriented, normal speech, no focal findings or movement disorder noted      Assessment:   Patient Active Problem List   Diagnosis    Spontaneous pneumothorax       Plan:   1. CXR reviewed- Continue daily CXR's   2. Continue chest tube management.     The plan of care was discussed in detail with CLINTON Salinas

## 2020-02-28 NOTE — PROGRESS NOTES
Reassessment  Neurological  2-1mm PERRL bilaterally  Oriented and Alert x3  Negative arm drift  Pedal push and push equal and strong bilaterally  Finger Squeeze equal and strong bilaterally    GI  Bowels heard and active x4  Abdomen nontender, nondistended    Cardiovascular  Capillary refill >3 bilaterally  Skin warm, dry, and appropriate for ethnicity  No peripheral edema present  Pedal pulses regular +2 bilaterally  Apical pulse regular +2    Lung sounds regular and clear  No laborious breathing   Patient states it is \"getting easier to take deep breaths. \"  Subcutaneous emphysema noted on right anterior shoulder extending down to right antecubital.     Slight bruising near chest tube site on mid back. Patient refuses to get up. Anxiety observed regarding her chest tube potentially coming dislodged. Patient also encouraged to drink fluids. A handout regarding MRSA patient education was given. She had questions and felt anxious about being tested postive for MRSA.  Sara Meléndez, [facility], .

## 2020-02-29 ENCOUNTER — APPOINTMENT (OUTPATIENT)
Dept: GENERAL RADIOLOGY | Age: 50
DRG: 201 | End: 2020-02-29
Attending: INTERNAL MEDICINE

## 2020-02-29 LAB
ANION GAP SERPL CALCULATED.3IONS-SCNC: 7 MEQ/L (ref 8–16)
BUN BLDV-MCNC: 15 MG/DL (ref 7–22)
CALCIUM SERPL-MCNC: 8.3 MG/DL (ref 8.5–10.5)
CHLORIDE BLD-SCNC: 108 MEQ/L (ref 98–111)
CO2: 25 MEQ/L (ref 23–33)
CREAT SERPL-MCNC: 0.4 MG/DL (ref 0.4–1.2)
ERYTHROCYTE [DISTWIDTH] IN BLOOD BY AUTOMATED COUNT: 14.8 % (ref 11.5–14.5)
ERYTHROCYTE [DISTWIDTH] IN BLOOD BY AUTOMATED COUNT: 55.7 FL (ref 35–45)
FOLATE: 4.3 NG/ML (ref 4.8–24.2)
GFR SERPL CREATININE-BSD FRML MDRD: > 90 ML/MIN/1.73M2
GLUCOSE BLD-MCNC: 94 MG/DL (ref 70–108)
HCT VFR BLD CALC: 33.3 % (ref 37–47)
HEMOGLOBIN: 10.2 GM/DL (ref 12–16)
MAGNESIUM: 1.8 MG/DL (ref 1.6–2.4)
MCH RBC QN AUTO: 31.4 PG (ref 26–33)
MCHC RBC AUTO-ENTMCNC: 30.6 GM/DL (ref 32.2–35.5)
MCV RBC AUTO: 102.5 FL (ref 81–99)
MRSA SCREEN: NORMAL
PLATELET # BLD: 294 THOU/MM3 (ref 130–400)
PMV BLD AUTO: 8.8 FL (ref 9.4–12.4)
POTASSIUM SERPL-SCNC: 4.2 MEQ/L (ref 3.5–5.2)
RBC # BLD: 3.25 MILL/MM3 (ref 4.2–5.4)
SODIUM BLD-SCNC: 140 MEQ/L (ref 135–145)
VITAMIN B-12: 247 PG/ML (ref 211–911)
WBC # BLD: 7.2 THOU/MM3 (ref 4.8–10.8)

## 2020-02-29 PROCEDURE — 6360000002 HC RX W HCPCS: Performed by: NURSE PRACTITIONER

## 2020-02-29 PROCEDURE — 2060000000 HC ICU INTERMEDIATE R&B

## 2020-02-29 PROCEDURE — 80048 BASIC METABOLIC PNL TOTAL CA: CPT

## 2020-02-29 PROCEDURE — 2709999900 HC NON-CHARGEABLE SUPPLY

## 2020-02-29 PROCEDURE — 6370000000 HC RX 637 (ALT 250 FOR IP): Performed by: NURSE PRACTITIONER

## 2020-02-29 PROCEDURE — 36415 COLL VENOUS BLD VENIPUNCTURE: CPT

## 2020-02-29 PROCEDURE — 82746 ASSAY OF FOLIC ACID SERUM: CPT

## 2020-02-29 PROCEDURE — 82607 VITAMIN B-12: CPT

## 2020-02-29 PROCEDURE — 85027 COMPLETE CBC AUTOMATED: CPT

## 2020-02-29 PROCEDURE — 83735 ASSAY OF MAGNESIUM: CPT

## 2020-02-29 PROCEDURE — 71045 X-RAY EXAM CHEST 1 VIEW: CPT

## 2020-02-29 PROCEDURE — 99232 SBSQ HOSP IP/OBS MODERATE 35: CPT | Performed by: THORACIC SURGERY (CARDIOTHORACIC VASCULAR SURGERY)

## 2020-02-29 PROCEDURE — 2580000003 HC RX 258: Performed by: NURSE PRACTITIONER

## 2020-02-29 PROCEDURE — 94761 N-INVAS EAR/PLS OXIMETRY MLT: CPT

## 2020-02-29 RX ADMIN — HYDROMORPHONE HYDROCHLORIDE 0.5 MG: 1 INJECTION, SOLUTION INTRAMUSCULAR; INTRAVENOUS; SUBCUTANEOUS at 13:41

## 2020-02-29 RX ADMIN — OXYCODONE HYDROCHLORIDE AND ACETAMINOPHEN 1 TABLET: 5; 325 TABLET ORAL at 21:09

## 2020-02-29 RX ADMIN — HYDROMORPHONE HYDROCHLORIDE 0.5 MG: 1 INJECTION, SOLUTION INTRAMUSCULAR; INTRAVENOUS; SUBCUTANEOUS at 01:49

## 2020-02-29 RX ADMIN — OXYCODONE HYDROCHLORIDE AND ACETAMINOPHEN 2 TABLET: 5; 325 TABLET ORAL at 16:44

## 2020-02-29 RX ADMIN — OXYCODONE HYDROCHLORIDE AND ACETAMINOPHEN 2 TABLET: 5; 325 TABLET ORAL at 00:18

## 2020-02-29 RX ADMIN — HYDROMORPHONE HYDROCHLORIDE 0.5 MG: 1 INJECTION, SOLUTION INTRAMUSCULAR; INTRAVENOUS; SUBCUTANEOUS at 09:21

## 2020-02-29 RX ADMIN — HYDROMORPHONE HYDROCHLORIDE 0.5 MG: 1 INJECTION, SOLUTION INTRAMUSCULAR; INTRAVENOUS; SUBCUTANEOUS at 18:12

## 2020-02-29 RX ADMIN — OXYCODONE HYDROCHLORIDE AND ACETAMINOPHEN 2 TABLET: 5; 325 TABLET ORAL at 08:16

## 2020-02-29 RX ADMIN — Medication 10 ML: at 08:18

## 2020-02-29 RX ADMIN — Medication 10 ML: at 19:52

## 2020-02-29 RX ADMIN — OXYCODONE HYDROCHLORIDE AND ACETAMINOPHEN 2 TABLET: 5; 325 TABLET ORAL at 12:15

## 2020-02-29 RX ADMIN — HYDROMORPHONE HYDROCHLORIDE 0.5 MG: 1 INJECTION, SOLUTION INTRAMUSCULAR; INTRAVENOUS; SUBCUTANEOUS at 22:35

## 2020-02-29 RX ADMIN — BISACODYL 5 MG: 5 TABLET, COATED ORAL at 08:19

## 2020-02-29 RX ADMIN — SODIUM CHLORIDE: 9 INJECTION, SOLUTION INTRAVENOUS at 00:08

## 2020-02-29 ASSESSMENT — PAIN DESCRIPTION - PAIN TYPE
TYPE: ACUTE PAIN
TYPE: ACUTE PAIN

## 2020-02-29 ASSESSMENT — PAIN SCALES - GENERAL
PAINLEVEL_OUTOF10: 10
PAINLEVEL_OUTOF10: 10
PAINLEVEL_OUTOF10: 7
PAINLEVEL_OUTOF10: 5
PAINLEVEL_OUTOF10: 9
PAINLEVEL_OUTOF10: 9
PAINLEVEL_OUTOF10: 5
PAINLEVEL_OUTOF10: 7
PAINLEVEL_OUTOF10: 10
PAINLEVEL_OUTOF10: 7
PAINLEVEL_OUTOF10: 10
PAINLEVEL_OUTOF10: 5
PAINLEVEL_OUTOF10: 10
PAINLEVEL_OUTOF10: 6
PAINLEVEL_OUTOF10: 6
PAINLEVEL_OUTOF10: 10
PAINLEVEL_OUTOF10: 7
PAINLEVEL_OUTOF10: 6
PAINLEVEL_OUTOF10: 7

## 2020-02-29 ASSESSMENT — PAIN DESCRIPTION - ORIENTATION
ORIENTATION: RIGHT;OUTER
ORIENTATION: RIGHT

## 2020-02-29 ASSESSMENT — PAIN DESCRIPTION - ONSET
ONSET: ON-GOING
ONSET: ON-GOING

## 2020-02-29 ASSESSMENT — PAIN DESCRIPTION - LOCATION
LOCATION: CHEST;BACK
LOCATION: CHEST

## 2020-02-29 ASSESSMENT — PAIN DESCRIPTION - PROGRESSION
CLINICAL_PROGRESSION: NOT CHANGED
CLINICAL_PROGRESSION: NOT CHANGED

## 2020-02-29 ASSESSMENT — PAIN DESCRIPTION - FREQUENCY
FREQUENCY: CONTINUOUS
FREQUENCY: CONTINUOUS

## 2020-02-29 ASSESSMENT — PAIN DESCRIPTION - DESCRIPTORS
DESCRIPTORS: ACHING;STABBING
DESCRIPTORS: SHARP

## 2020-02-29 NOTE — PROGRESS NOTES
VASCULAR: No intermittent claudication or unusual leg cramps. MUSCULOSKELETAL: Occasional arthralgias, myalgias. Increased discomfort reported to chest tube site. NEUROLOGICAL: Denies any headache, near syncope, seizures or syncope. HEMATOLOGIC:  No unusual bruising or bleeding. PSYCH: Denies any homicidal or suicidial ideations. Medications:  Reviewed    Infusion Medications   Scheduled Medications    sodium chloride flush  10 mL Intravenous 2 times per day    nicotine  1 patch Transdermal Daily     PRN Meds: sodium chloride flush, acetaminophen, acetaminophen, promethazine, ondansetron, potassium chloride **OR** potassium alternative oral replacement **OR** potassium chloride, magnesium sulfate, bisacodyl, HYDROmorphone **OR** HYDROmorphone, oxyCODONE-acetaminophen **OR** oxyCODONE-acetaminophen      Intake/Output Summary (Last 24 hours) at 2/29/2020 1717  Last data filed at 2/29/2020 1315  Gross per 24 hour   Intake 2144.37 ml   Output 3311 ml   Net -1166.63 ml       Diet:  DIET GENERAL;    Exam:  BP (!) 98/55   Pulse 85   Temp 98 °F (36.7 °C) (Oral)   Resp 16   Ht 5' 2\" (1.575 m)   Wt 120 lb 12.8 oz (54.8 kg)   SpO2 95%   BMI 22.09 kg/m²     General appearance: Alert and appropriate, pleasant adult female. No apparent distress, appears stated age and cooperative. HEENT: Pupils equal, round, and reactive to light. Conjunctivae/corneas clear. Neck: Supple, with full range of motion. No jugular venous distention. Trachea midline. Respiratory:  Normal respiratory effort. Clear to auscultation, bilaterally without Rales/Wheezes/Rhonchi. Cardiovascular: Regular rate and rhythm with normal S1/S2 without murmurs, rubs or gallops. Abdomen: Soft, non-tender, non-distended with normal bowel sounds. Musculoskeletal: Passive and active ROM x 4 extremities. Skin: Skin color, texture, turgor normal.  No rashes or lesions. Neurologic:  Neurovascularly intact without any focal sensory/motor deficits. Cranial nerves: II-XII intact, grossly non-focal.  Psychiatric: Alert and oriented to person, place, time, and situation. Thought content appropriate, normal insight  Capillary Refill: Brisk,< 3 seconds   Peripheral Pulses: +2 palpable, equal bilaterally     Labs:   Recent Labs     02/28/20  0611 02/29/20 0527   WBC 7.3 7.2   HGB 10.2* 10.2*   HCT 33.4* 33.3*    294     Recent Labs     02/28/20  0611 02/29/20 0527    140   K 3.5 4.2    108   CO2 23 25   BUN 13 15   CREATININE 0.4 0.4   CALCIUM 8.2* 8.3*     No results for input(s): AST, ALT, BILIDIR, BILITOT, ALKPHOS in the last 72 hours. No results for input(s): INR in the last 72 hours. No results for input(s): Lutz Shall in the last 72 hours. Microbiology:    Blood culture #1: No results found for: BC    Blood culture #2:No results found for: Brandon Castillo    Organism:No results found for: ORG    No results found for: LABGRAM    MRSA culture only:No results found for: [facility]    Urine culture: No results found for: LABURIN    Respiratory culture: No results found for: CULTRESP    Aerobic and Anaerobic :  No results found for: LABAERO  No results found for: LABANAE    Urinalysis:    No results found for: [place], BACTERIA, RBCUA, BLOODU, SPECGRAV, GLUCOSEU    Radiology:  XR CHEST PORTABLE   Final Result   . Small right pneumothorax visualized. 2. Right-sided chest tube present. Correlate with clinical adjustment or tube clamping. **This report has been created using voice recognition software. It may contain minor errors which are inherent in voice recognition technology. **      Final report electronically signed by Dr. Lenard Guaman on 2/29/2020 6:14 AM      XR CHEST PORTABLE   Final Result   . 1. Visualized right-sided chest tube. No acute pneumothorax. 2. Coarse lung markings. 3. Prominent subcutaneous emphysema along the right thorax. **This report has been created using voice recognition software.  It may contain minor errors which are inherent in voice recognition technology. **      Final report electronically signed by Dr. Azar Loyd on 2/28/2020 4:49 AM      CT CHEST WO CONTRAST   Final Result   1. Large bore chest tube right side. Small less than 5% pneumothorax right side. 2. Extensive pneumomediastinum. Relatively severe subcutaneous emphysema on the right side of the chest laterally extending anteriorly and posteriorly. Mild bibasilar atelectasis/pneumonia. 3. Note that the large bore chest tube appears to extend into the lung parenchyma posteriorly with some mild adjacent atelectasis or possibly tiny amount of associated focal hemorrhage along the tube tract. **This report has been created using voice recognition software. It may contain minor errors which are inherent in voice recognition technology. **      Final report electronically signed by Dr. Lisa Pisano on 2/27/2020 2:01 PM      XR CHEST PORTABLE    (Results Pending)     Ct Chest Wo Contrast    Result Date: 2/27/2020  PROCEDURE: CT CHEST WO CONTRAST CLINICAL INFORMATION: pneumothorax COMPARISON: No prior study. TECHNIQUE: Multiple axial 5 millimeter images of the thorax and upper abdomen were obtained without the administration of intravenous contrast material . All CT scans at this facility use dose modulation, iterative reconstruction, and/or weight-based dosing when appropriate to reduce radiation dose to as low as reasonably achievable. FINDINGS: Upper abdomen: Prior cholecystectomy. Mediastinum and linda: No abnormally enlarged hilar or mediastinal lymph nodes are seen. There is evidence for moderate pneumomediastinum, however, which dissects into the lower cervical soft tissues. Bones: No evidence for acute fracture or bone destruction. There is extensive subcutaneous emphysema along the anterior and posterior, and right sides of the chest extending into the supraclavicular region.  Lungs: Large bore chest tube right side of chest located posteriorly. Small less than 5% pneumothorax right side. Mild bibasilar atelectasis/pneumonia in the posterior costophrenic angles and mild increased parenchymal density along the course of the chest tube, possibly representing atelectasis or minimal focal hemorrhage. 1. Large bore chest tube right side. Small less than 5% pneumothorax right side. 2. Extensive pneumomediastinum. Relatively severe subcutaneous emphysema on the right side of the chest laterally extending anteriorly and posteriorly. Mild bibasilar atelectasis/pneumonia. 3. Note that the large bore chest tube appears to extend into the lung parenchyma posteriorly with some mild adjacent atelectasis or possibly tiny amount of associated focal hemorrhage along the tube tract. **This report has been created using voice recognition software. It may contain minor errors which are inherent in voice recognition technology. ** Final report electronically signed by Dr. Emily Lassiter on 2/27/2020 2:01 PM    Xr Chest Portable    Result Date: 2/28/2020  PROCEDURE: XR CHEST PORTABLE CLINICAL INFORMATION: penumothorax. COMPARISON: No prior study. TECHNIQUE: AP upright view of the chest. FINDINGS: The heart is within normal limits. Subcutaneous emphysema along the right chest wall and lateral thorax is present. There is a right-sided chest tube. There is no acute appearing pneumothorax present. Left lung is clear. Proximal abdominal soft tissues visualized surgical clips suggesting prior cholecystectomy. The skeleton is negative for acute or suspicious pathology. . 1. Visualized right-sided chest tube. No acute pneumothorax. 2. Coarse lung markings. 3. Prominent subcutaneous emphysema along the right thorax. **This report has been created using voice recognition software. It may contain minor errors which are inherent in voice recognition technology. ** Final report electronically signed by Dr. Angelina Rodriguez on 2/28/2020 4:49 AM    Electronically signed by ZENAIDA Maurer CNP on 2/29/2020 at 5:17 PM

## 2020-02-29 NOTE — PLAN OF CARE
Problem: Pain:  Goal: Pain level will decrease  Description  Pain level will decrease  Outcome: Ongoing  Note:   Patient rating pain as 7-10/10. Pain goal 5/10. PRN pain medication given. Pt satisfied. Will continue to monitor. Problem: Infection:  Goal: Will remain free from infection  Description  Will remain free from infection  Outcome: Ongoing  Note:   No s/s of infection. Will continue to monitor. Problem: Skin Integrity:  Goal: Skin integrity will stabilize  Description  Skin integrity will stabilize  Outcome: Ongoing  Note:   Pt repositions self in bed. No new signs of skin breakdown present. Will continue to monitor. Problem: Discharge Planning:  Goal: Patients continuum of care needs are met  Description  Patients continuum of care needs are met  Outcome: Ongoing  Note:   Plans to be discharged home with daughter. Problem: Falls - Risk of:  Goal: Will remain free from falls  Description  Will remain free from falls  Outcome: Ongoing  Note:   Pt up with one assist to bedside commode. Non slid socks on feet. Pathway free of clutter. Bed in lowest position with alarm on. Personal belongings and call light within reach. Care plan reviewed with patient. Patient verbalizes understanding of the plan of care and contributes to goal setting.

## 2020-02-29 NOTE — PROGRESS NOTES
clear.  Neck: Supple, with full range of motion. No jugular venous distention. Trachea midline. Respiratory:  Normal respiratory effort. Clear to auscultation, bilaterally without Rales/Wheezes/Rhonchi. Cardiovascular:  Regular rate and rhythm with normal S1/S2 without murmurs, rubs or gallops. Abdomen: Soft, non-tender, non-distended with normal bowel sounds. Musculoskeletal:  No clubbing, cyanosis or edema bilaterally. Full range of motion without deformity. Skin: Skin color, texture, turgor normal.  No rashes or lesions. Neurologic:  Neurovascularly intact without any focal sensory/motor deficits. Cranial nerves: II-XII intact, grossly non-focal.  Psychiatric:  Alert and oriented, thought content appropriate, normal insight  Capillary Refill: Brisk,< 3 seconds   Peripheral Pulses: +2 palpable, equal bilaterally       Labs:     Recent Labs     02/28/20  0611 02/29/20  0527   WBC 7.3 7.2   HGB 10.2* 10.2*   HCT 33.4* 33.3*    294     Recent Labs     02/28/20  0611 02/29/20  0527    140   K 3.5 4.2    108   CO2 23 25   BUN 13 15   CREATININE 0.4 0.4   CALCIUM 8.2* 8.3*     Radiology:     CXR: lung expanded; subcutaneous emphysema somewhat improved      Code Status: Full Code      ASSESSMENT:    Active Hospital Problems    Diagnosis Date Noted    Spontaneous pneumothorax [J93.83] 02/27/2020       PLAN:  Continue tube to suction. D/c IVF. Further plans per Dr. Carla Betancourt. Time spent with patient: 25 minutes, of which more than 50% was spent counseling/coordinating the patient's care.     Electronically signed by Aren Oneil MD on 2/29/2020 at 10:59 AM

## 2020-03-01 ENCOUNTER — APPOINTMENT (OUTPATIENT)
Dept: GENERAL RADIOLOGY | Age: 50
DRG: 201 | End: 2020-03-01
Attending: INTERNAL MEDICINE

## 2020-03-01 PROCEDURE — 2580000003 HC RX 258: Performed by: NURSE PRACTITIONER

## 2020-03-01 PROCEDURE — 99232 SBSQ HOSP IP/OBS MODERATE 35: CPT | Performed by: NURSE PRACTITIONER

## 2020-03-01 PROCEDURE — 71045 X-RAY EXAM CHEST 1 VIEW: CPT

## 2020-03-01 PROCEDURE — 6360000002 HC RX W HCPCS: Performed by: NURSE PRACTITIONER

## 2020-03-01 PROCEDURE — 6370000000 HC RX 637 (ALT 250 FOR IP): Performed by: NURSE PRACTITIONER

## 2020-03-01 PROCEDURE — 94760 N-INVAS EAR/PLS OXIMETRY 1: CPT

## 2020-03-01 PROCEDURE — 2060000000 HC ICU INTERMEDIATE R&B

## 2020-03-01 PROCEDURE — 2709999900 HC NON-CHARGEABLE SUPPLY

## 2020-03-01 PROCEDURE — 99232 SBSQ HOSP IP/OBS MODERATE 35: CPT | Performed by: THORACIC SURGERY (CARDIOTHORACIC VASCULAR SURGERY)

## 2020-03-01 RX ADMIN — HYDROMORPHONE HYDROCHLORIDE 0.5 MG: 1 INJECTION, SOLUTION INTRAMUSCULAR; INTRAVENOUS; SUBCUTANEOUS at 12:35

## 2020-03-01 RX ADMIN — HYDROMORPHONE HYDROCHLORIDE 0.5 MG: 1 INJECTION, SOLUTION INTRAMUSCULAR; INTRAVENOUS; SUBCUTANEOUS at 20:55

## 2020-03-01 RX ADMIN — HYDROMORPHONE HYDROCHLORIDE 0.5 MG: 1 INJECTION, SOLUTION INTRAMUSCULAR; INTRAVENOUS; SUBCUTANEOUS at 02:32

## 2020-03-01 RX ADMIN — OXYCODONE HYDROCHLORIDE AND ACETAMINOPHEN 2 TABLET: 5; 325 TABLET ORAL at 00:54

## 2020-03-01 RX ADMIN — HYDROMORPHONE HYDROCHLORIDE 0.5 MG: 1 INJECTION, SOLUTION INTRAMUSCULAR; INTRAVENOUS; SUBCUTANEOUS at 05:49

## 2020-03-01 RX ADMIN — OXYCODONE HYDROCHLORIDE AND ACETAMINOPHEN 2 TABLET: 5; 325 TABLET ORAL at 19:44

## 2020-03-01 RX ADMIN — OXYCODONE HYDROCHLORIDE AND ACETAMINOPHEN 2 TABLET: 5; 325 TABLET ORAL at 04:28

## 2020-03-01 RX ADMIN — BISACODYL 5 MG: 5 TABLET, COATED ORAL at 08:57

## 2020-03-01 RX ADMIN — OXYCODONE HYDROCHLORIDE AND ACETAMINOPHEN 2 TABLET: 5; 325 TABLET ORAL at 13:37

## 2020-03-01 RX ADMIN — HYDROMORPHONE HYDROCHLORIDE 0.5 MG: 1 INJECTION, SOLUTION INTRAMUSCULAR; INTRAVENOUS; SUBCUTANEOUS at 17:54

## 2020-03-01 RX ADMIN — MAGNESIUM HYDROXIDE 30 ML: 2400 SUSPENSION ORAL at 19:52

## 2020-03-01 RX ADMIN — Medication 10 ML: at 19:44

## 2020-03-01 RX ADMIN — OXYCODONE HYDROCHLORIDE AND ACETAMINOPHEN 2 TABLET: 5; 325 TABLET ORAL at 08:57

## 2020-03-01 RX ADMIN — Medication 10 ML: at 08:57

## 2020-03-01 ASSESSMENT — PAIN DESCRIPTION - DIRECTION: RADIATING_TOWARDS: ALL OVER

## 2020-03-01 ASSESSMENT — PAIN SCALES - GENERAL
PAINLEVEL_OUTOF10: 0
PAINLEVEL_OUTOF10: 8
PAINLEVEL_OUTOF10: 7
PAINLEVEL_OUTOF10: 5
PAINLEVEL_OUTOF10: 8
PAINLEVEL_OUTOF10: 5
PAINLEVEL_OUTOF10: 7
PAINLEVEL_OUTOF10: 10
PAINLEVEL_OUTOF10: 6
PAINLEVEL_OUTOF10: 7
PAINLEVEL_OUTOF10: 7
PAINLEVEL_OUTOF10: 10
PAINLEVEL_OUTOF10: 8
PAINLEVEL_OUTOF10: 8
PAINLEVEL_OUTOF10: 7
PAINLEVEL_OUTOF10: 8
PAINLEVEL_OUTOF10: 7
PAINLEVEL_OUTOF10: 7
PAINLEVEL_OUTOF10: 0
PAINLEVEL_OUTOF10: 9

## 2020-03-01 ASSESSMENT — PAIN DESCRIPTION - PROGRESSION
CLINICAL_PROGRESSION: NOT CHANGED

## 2020-03-01 ASSESSMENT — PAIN DESCRIPTION - FREQUENCY
FREQUENCY: CONTINUOUS

## 2020-03-01 ASSESSMENT — PAIN DESCRIPTION - PAIN TYPE
TYPE: ACUTE PAIN

## 2020-03-01 ASSESSMENT — PAIN DESCRIPTION - DESCRIPTORS
DESCRIPTORS: ACHING;BURNING
DESCRIPTORS: SHARP
DESCRIPTORS: ACHING;CONSTANT

## 2020-03-01 ASSESSMENT — PAIN DESCRIPTION - ONSET
ONSET: ON-GOING

## 2020-03-01 ASSESSMENT — PAIN - FUNCTIONAL ASSESSMENT
PAIN_FUNCTIONAL_ASSESSMENT: PREVENTS OR INTERFERES SOME ACTIVE ACTIVITIES AND ADLS

## 2020-03-01 ASSESSMENT — PAIN DESCRIPTION - LOCATION
LOCATION: CHEST;BACK

## 2020-03-01 ASSESSMENT — PAIN DESCRIPTION - ORIENTATION
ORIENTATION: RIGHT;OUTER
ORIENTATION: RIGHT;OUTER
ORIENTATION: RIGHT;OTHER (COMMENT)

## 2020-03-01 NOTE — PROGRESS NOTES
Cardiothoracic Surgery History & Physical        Patient:  Carol Smalls  YOB: 1970    MRN: [id number]     Acct: [de-identified]    Date of Admission: 2/27/2020    Interim History:  Stable, comfortable on RA. No air leak, tube patent. 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 Authorizing Provider   vitamin B-6 (PYRIDOXINE) 100 MG tablet Take 100 mg by mouth daily   Yes Historical Provider, MD   Albuterol Sulfate (VENTOLIN HFA IN) Inhale 2 puffs into the lungs every 6 hours as needed    Yes Historical Provider, MD       Allergies:  Codeine; Penicillins; and Food    Social History:      TOBACCO:   reports that she quit smoking about 4 weeks ago. Her smoking use included cigarettes. She has a 32.00 pack-year smoking history. She does not have any smokeless tobacco history on file. ETOH:   reports no history of alcohol use. Social History     Substance and Sexual Activity   Drug Use Never         Family History:          Problem Relation Age of Onset    Other Father         clotting disorder-dies age 63    Other Paternal Uncle         clotting d/o    Other Paternal Grandfather         clotting d/o        PHYSICAL EXAM:    BP 97/64   Pulse 84   Temp 97.9 °F (36.6 °C) (Oral)   Resp 18   Ht 5' 2\" (1.575 m)   Wt 119 lb 9.6 oz (54.3 kg)   SpO2 93%   BMI 21.88 kg/m²     General appearance:  No apparent distress, appears stated age and cooperative. HEENT:  Normal cephalic, atraumatic without obvious deformity. Pupils equal, round, and reactive to light. Extra ocular muscles intact. Conjunctivae/corneas clear. Neck: Supple, with full range of motion. No jugular venous distention. Trachea midline. Respiratory:  Normal respiratory effort. Clear to auscultation, bilaterally without Rales/Wheezes/Rhonchi. Cardiovascular:  Regular rate and rhythm with normal S1/S2 without murmurs, rubs or gallops. Abdomen: Soft, non-tender, non-distended with normal bowel sounds. Musculoskeletal:  No clubbing, cyanosis or edema bilaterally. Full range of motion without deformity. Skin: Skin color, texture, turgor normal.  No rashes or lesions. Neurologic:  Neurovascularly intact without any focal sensory/motor deficits. Cranial nerves: II-XII intact, grossly non-focal.  Psychiatric:  Alert and oriented, thought content appropriate, normal insight  Capillary Refill: Brisk,< 3 seconds   Peripheral Pulses: +2 palpable, equal bilaterally       Labs:     Recent Labs     02/28/20  0611 02/29/20  0527   WBC 7.3 7.2   HGB 10.2* 10.2*   HCT 33.4* 33.3*    294     Recent Labs     02/28/20  0611 02/29/20  0527    140   K 3.5 4.2    108   CO2 23 25   BUN 13 15   CREATININE 0.4 0.4   CALCIUM 8.2* 8.3*     Radiology:     CXR: lung expanded, tiny apical PTX      Code Status: Full Code      ASSESSMENT:    Active Hospital Problems    Diagnosis Date Noted    Spontaneous pneumothorax [J93.83] 02/27/2020       PLAN:  Continue tube to suction. In context of multiply recurrent PTX, possible VATS/pleurodesis per Dr. Jay Fletcher. Time spent with patient: 25 minutes, of which more than 50% was spent counseling/coordinating the patient's care.     Electronically signed by Roberta Perez MD on 3/1/2020 at 9:46 AM

## 2020-03-01 NOTE — PLAN OF CARE
Problem: Pain:  Goal: Pain level will decrease  Description  Pain level will decrease  Outcome: Ongoing  Note:   Pain Assessment: 0-10  Pain Level: 7   Patient's Stated Pain Goal: 5   Is pain goal met at this time? Yes     Non-Pharmaceutical Pain Intervention(s): Rest, Repositioned, Relaxation techniques       Problem: Infection:  Goal: Will remain free from infection  Description  Will remain free from infection  Outcome: Ongoing  Note:   Contact precautions      Problem: Daily Care:  Goal: Daily care needs are met  Description  Daily care needs are met  Outcome: Ongoing  Note:   Hourly rounding in place      Problem: Skin Integrity:  Goal: Skin integrity will stabilize  Description  Skin integrity will stabilize  Outcome: Ongoing  Note:   Patient repositioning self often      Problem: Discharge Planning:  Goal: Patients continuum of care needs are met  Description  Patients continuum of care needs are met  Outcome: Ongoing  Note:   Plans to discharge home      Problem: Falls - Risk of:  Goal: Will remain free from falls  Description  Will remain free from falls  Outcome: Ongoing  Note:   No falls this shift. Bed is locked and in lowest position. Pt was instructed on how to use call light and oriented to room. Call light and overhead table within reach. Will continue to monitor   Bed alarm      Problem: Infection - Methicillin-Resistant Staphylococcus Aureus Infection:  Goal: Absence of methicillin-resistant Staphylococcus aureus infection  Description  Absence of methicillin-resistant Staphylococcus aureus infection  Outcome: Ongoing  Note:   Contact precautions in place    Care plan reviewed with patient and family. Patient and family verbalize understanding of the plan of care and contribute to goal setting.

## 2020-03-01 NOTE — PROGRESS NOTES
Hospitalist Progress Note      Patient:  Terrance Jimenez    Unit/Bed:4K-09/009-A  YOB: 1970  MRN: [id number]   Acct: [de-identified]   PCP: Berkley Kohler MD  Date of Admission: 2/27/2020    Assessment/Plan:    1. Spontaneous large right pneumothorax: Chest tube was placed at [facility] performed on 3/1/2020 reports persistent right pneumothorax with stable position of right-sided chest tube. Minimal right lower lobe atelectasis and/or infiltrate. Continue chest tube management and repeat CXR in the morning.  CT surgery following, appreciate assistance. 2. Extensive subcutaneous emphysema: CT of the chest performed on 2/27 reported relatively severe subcutaneous emphysema on the right side of the chest laterally extending anteriorly and posteriorly.  Continue chest tube management, repeat CXR in the morning.  CT surgery following. 3. Hypokalemia: Resolved.  Current potassium 4.2.  4. Acute macrocytic anemia: Stable. Current H&H 10.2/33.3 with an MCV of 102. 5.  Probably secondary to above problems.  No overt signs of bleeding at this time. B12 and folate within normal limits. Continue to monitor for changes, recheck CBC in the morning. 5. Remote smoker: Patient reports quitting smoking approximately 3 weeks ago. Maurizio Gomez was educated on the importance of smoking cessation and was receptive to the education.  Currently denies NRT. Chief Complaint: Right-sided chest pain    Initial H and P:-    Carmen Betancourt a 76-year-old  female, reformed smoker, with a medical history of asthma and ptosis.   Patient presented to [facility] with complaints of right-sided chest pain after she was initially seen at [facility] this morning. Maurizio Gomez was initially seen at [address] [place] [address] for right-sided chest pain two spontaneous pneumothorax 2 weeks ago. Ethelene Seats reported that she had a chest tube at that time and presented today with complaints of right-sided chest pain started last evening after she was cleaning and stated that she felt air bubbles.  While in the ER, the patient complained of right lower chest wall pain that she rated a 10/10 on the pain scale.  She also complained of shortness of breath and stated that she used to smoke 1 pack/day for a long time but she quit 3 weeks ago because she wanted to breathe.  While in the ER, the patient denied any lightheadedness, dizziness, or nausea.  Patient reports that she tries not to cough because it increases her pain.  The hospitalist service was contacted for further evaluation, treatment, and management. Subjective (past 24 hours):   Patient resting in bed watching TV at the time of the interview. States that she had an extreme episode of pain early in the morning in which she would needed pain medication for relief. Patient denies any pain or discomfort at the time other than tenderness at the chest tube site. She currently denies any other chest pain, shortness of breath, nausea, vomiting, abdominal pain, diarrhea, constipation, urinary complaints, lightheadedness, dizziness, headaches, change in vision, fever, or chills. She was updated on the current plan of care and has no other needs or questions at this time. Past medical history, family history, social history and allergies reviewed again and is unchanged since admission. ROS (14 point review of systems completed. Pertinent positives noted. Otherwise ROS is negative) :  GENERAL: No fever,chills, or night sweats. SKIN: No lesions or rashes. HEAD: No headaches or recent injury. EYES: No acute changes in vision, no diplopia or blurred vision. EARS: No hearing loss, no tinnitus. NOSE/THROAT: No rhinorrhea or pharyngitis, no nasal drainage. NECK: No lumps or unusual neck stiffness. PULMONARY: Respirations easy and non-labored, no acute distress.   CARDIAC: No chest pain, pressure, tissues visualized surgical clips suggesting prior cholecystectomy. The skeleton is negative for acute or suspicious pathology. . 1. Visualized right-sided chest tube. No acute pneumothorax. 2. Coarse lung markings. 3. Prominent subcutaneous emphysema along the right thorax. **This report has been created using voice recognition software. It may contain minor errors which are inherent in voice recognition technology. ** Final report electronically signed by Dr. Nichelle Rodriguez on 2/28/2020 4:49 AM    Electronically signed by ZENAIDA Maurer CNP on 3/1/2020 at 11:02 AM

## 2020-03-01 NOTE — PROGRESS NOTES
0056: Pt. Called out in pain from chest tube, said it felt like something was pushing into her. Pain 10/10 Pt stated she just been up to use the bathroom, and said when she laid back down that's when it started hurting. Pt. Was tachyphenic, but SATS in the 80's. Chest tube had more drainage then previous shifts. Dressing around the chest tube was removed to check and see if the tube had come out, and tube looked in place. Messaged [employer]	[place] and got morning cxr moved to STAT, and okay to give pain medications early.

## 2020-03-02 ENCOUNTER — APPOINTMENT (OUTPATIENT)
Dept: GENERAL RADIOLOGY | Age: 50
DRG: 201 | End: 2020-03-02
Attending: INTERNAL MEDICINE

## 2020-03-02 PROCEDURE — APPSS30 APP SPLIT SHARED TIME 16-30 MINUTES: Performed by: PHYSICIAN ASSISTANT

## 2020-03-02 PROCEDURE — 2060000000 HC ICU INTERMEDIATE R&B

## 2020-03-02 PROCEDURE — 2580000003 HC RX 258: Performed by: NURSE PRACTITIONER

## 2020-03-02 PROCEDURE — 71045 X-RAY EXAM CHEST 1 VIEW: CPT

## 2020-03-02 PROCEDURE — 6370000000 HC RX 637 (ALT 250 FOR IP): Performed by: NURSE PRACTITIONER

## 2020-03-02 PROCEDURE — 99232 SBSQ HOSP IP/OBS MODERATE 35: CPT | Performed by: NURSE PRACTITIONER

## 2020-03-02 PROCEDURE — 6360000002 HC RX W HCPCS: Performed by: NURSE PRACTITIONER

## 2020-03-02 PROCEDURE — 2709999900 HC NON-CHARGEABLE SUPPLY

## 2020-03-02 RX ADMIN — HYDROMORPHONE HYDROCHLORIDE 0.5 MG: 1 INJECTION, SOLUTION INTRAMUSCULAR; INTRAVENOUS; SUBCUTANEOUS at 08:43

## 2020-03-02 RX ADMIN — BISACODYL 5 MG: 5 TABLET, COATED ORAL at 08:05

## 2020-03-02 RX ADMIN — Medication 10 ML: at 20:05

## 2020-03-02 RX ADMIN — Medication 10 ML: at 08:04

## 2020-03-02 RX ADMIN — OXYCODONE HYDROCHLORIDE AND ACETAMINOPHEN 2 TABLET: 5; 325 TABLET ORAL at 18:00

## 2020-03-02 RX ADMIN — HYDROMORPHONE HYDROCHLORIDE 0.5 MG: 1 INJECTION, SOLUTION INTRAMUSCULAR; INTRAVENOUS; SUBCUTANEOUS at 14:04

## 2020-03-02 RX ADMIN — HYDROMORPHONE HYDROCHLORIDE 0.5 MG: 1 INJECTION, SOLUTION INTRAMUSCULAR; INTRAVENOUS; SUBCUTANEOUS at 00:21

## 2020-03-02 RX ADMIN — OXYCODONE HYDROCHLORIDE AND ACETAMINOPHEN 2 TABLET: 5; 325 TABLET ORAL at 22:38

## 2020-03-02 RX ADMIN — OXYCODONE HYDROCHLORIDE AND ACETAMINOPHEN 2 TABLET: 5; 325 TABLET ORAL at 08:03

## 2020-03-02 RX ADMIN — HYDROMORPHONE HYDROCHLORIDE 0.25 MG: 1 INJECTION, SOLUTION INTRAMUSCULAR; INTRAVENOUS; SUBCUTANEOUS at 20:05

## 2020-03-02 RX ADMIN — MAGNESIUM HYDROXIDE 30 ML: 2400 SUSPENSION ORAL at 08:08

## 2020-03-02 RX ADMIN — HYDROMORPHONE HYDROCHLORIDE 0.5 MG: 1 INJECTION, SOLUTION INTRAMUSCULAR; INTRAVENOUS; SUBCUTANEOUS at 17:08

## 2020-03-02 RX ADMIN — OXYCODONE HYDROCHLORIDE AND ACETAMINOPHEN 2 TABLET: 5; 325 TABLET ORAL at 01:36

## 2020-03-02 RX ADMIN — HYDROMORPHONE HYDROCHLORIDE 0.5 MG: 1 INJECTION, SOLUTION INTRAMUSCULAR; INTRAVENOUS; SUBCUTANEOUS at 04:29

## 2020-03-02 RX ADMIN — OXYCODONE HYDROCHLORIDE AND ACETAMINOPHEN 2 TABLET: 5; 325 TABLET ORAL at 13:17

## 2020-03-02 ASSESSMENT — PAIN SCALES - GENERAL
PAINLEVEL_OUTOF10: 9
PAINLEVEL_OUTOF10: 8
PAINLEVEL_OUTOF10: 9
PAINLEVEL_OUTOF10: 10
PAINLEVEL_OUTOF10: 8
PAINLEVEL_OUTOF10: 9
PAINLEVEL_OUTOF10: 8
PAINLEVEL_OUTOF10: 7
PAINLEVEL_OUTOF10: 9
PAINLEVEL_OUTOF10: 8
PAINLEVEL_OUTOF10: 6
PAINLEVEL_OUTOF10: 6
PAINLEVEL_OUTOF10: 9
PAINLEVEL_OUTOF10: 6
PAINLEVEL_OUTOF10: 9
PAINLEVEL_OUTOF10: 6
PAINLEVEL_OUTOF10: 5
PAINLEVEL_OUTOF10: 5

## 2020-03-02 ASSESSMENT — PAIN DESCRIPTION - PAIN TYPE
TYPE: ACUTE PAIN

## 2020-03-02 ASSESSMENT — PAIN DESCRIPTION - PROGRESSION
CLINICAL_PROGRESSION: NOT CHANGED

## 2020-03-02 ASSESSMENT — PAIN DESCRIPTION - ORIENTATION
ORIENTATION: RIGHT

## 2020-03-02 ASSESSMENT — PAIN DESCRIPTION - FREQUENCY
FREQUENCY: CONTINUOUS

## 2020-03-02 ASSESSMENT — PAIN - FUNCTIONAL ASSESSMENT
PAIN_FUNCTIONAL_ASSESSMENT: PREVENTS OR INTERFERES SOME ACTIVE ACTIVITIES AND ADLS

## 2020-03-02 ASSESSMENT — PAIN DESCRIPTION - LOCATION
LOCATION: CHEST;BACK

## 2020-03-02 ASSESSMENT — PAIN DESCRIPTION - ONSET
ONSET: ON-GOING

## 2020-03-02 ASSESSMENT — PAIN DESCRIPTION - DESCRIPTORS
DESCRIPTORS: ACHING;BURNING
DESCRIPTORS: ACHING;CONSTANT
DESCRIPTORS: ACHING;BURNING

## 2020-03-02 ASSESSMENT — PAIN DESCRIPTION - DIRECTION
RADIATING_TOWARDS: ALL OVER
RADIATING_TOWARDS: ALL OVER

## 2020-03-02 NOTE — PROGRESS NOTES
CT/CV Surgery Progress Note    3/2/2020 7:42 AM  Surgeon:  Dr. Lawrence Median     Subjective:  Ms. Anabella Barrera is resting comfortably in bed on 1L NC O2 sat 96%. She is alert, and in no acute distress. Chest tube placed for spontaneous pneumothorax with history of 2 previous spontaneous pneumothorax. Chest tube output: 30 cc past shift, 70 cc 24 hours. No air leak. Pt denies chest pressure, SOB, fever,chills, N/V/D. Vital Signs: BP (!) 94/56   Pulse 72   Temp 98 °F (36.7 °C) (Oral)   Resp 18   Ht 5' 2\" (1.575 m)   Wt 120 lb (54.4 kg)   SpO2 92%   BMI 21.95 kg/m²    Temp (24hrs), Av.8 °F (36.6 °C), Min:97.5 °F (36.4 °C), Max:98.1 °F (36.7 °C)     Labs:   CBC:  Recent Labs     20  0527   WBC 7.2   HGB 10.2*   HCT 33.3*   .5*        BMP:   Recent Labs     20  0527      K 4.2      CO2 25   BUN 15   CREATININE 0.4   MG 1.8     Imaging:  CXR: 3/2/20  1. . Persistent right pneumothorax. 2. Redemonstration of right-sided chest tube. 3. Persistent subcutaneous emphysema along the right chest wall. 20 CT Chest  1. Large bore chest tube right side. Small less than 5% pneumothorax right side. 2. Extensive pneumomediastinum. Relatively severe subcutaneous emphysema on the right side of the chest laterally extending anteriorly and posteriorly. Mild bibasilar atelectasis/pneumonia. 3. Note that the large bore chest tube appears to extend into the lung parenchyma posteriorly with some mild adjacent atelectasis or possibly tiny amount of associated focal hemorrhage along the tube tract. Intake/Output Summary (Last 24 hours) at 3/2/2020 0742  Last data filed at 3/2/2020 0255  Gross per 24 hour   Intake 1050 ml   Output 2720 ml   Net -1670 ml     Scheduled Meds:    sodium chloride flush  10 mL Intravenous 2 times per day    nicotine  1 patch Transdermal Daily     ROS: All neg unless specifically mentioned in subjective section.      Exam:  General Appearance: alert ,conversing, in no acute distress  Cardiovascular: normal rate, regular rhythm, normal S1 and S2, no murmurs, rubs, clicks, or gallops  Pulmonary/Chest: Subcutaneous emphysema right chest.  Lungs clear to auscultation bilaterally- no wheezes, rales or rhonchi, normal air movement, no respiratory distress  Neurological: alert, oriented, normal speech, no focal findings or movement disorder noted    Assessment:   Patient Active Problem List   Diagnosis    Spontaneous pneumothorax     Plan:   1. CXR reviewed---will place to H20 seal and then clamp at 6pm tonight with plan for 12 hour CXR around 6am tomorrow. 2. If no PTX tomorrow, chest tube will be removed. If worsening PTX, pt will require procedure.     The plan of care was discussed in detail with Dr. Donna Martinez PA-C

## 2020-03-02 NOTE — PLAN OF CARE
Problem: Pain:  Goal: Pain level will decrease  Description  Pain level will decrease  Outcome: Ongoing  Note:   Pain Assessment: 0-10  Pain Level: 5   Patient's Stated Pain Goal: 5   Is pain goal met at this time? Yes     Non-Pharmaceutical Pain Intervention(s): Repositioned, Rest       Problem: Infection:  Goal: Will remain free from infection  Description  Will remain free from infection  Outcome: Ongoing  Note:   Dressing changed daily on chest tube, clean dry and intact     Problem: Daily Care:  Goal: Daily care needs are met  Description  Daily care needs are met  Outcome: Ongoing  Note:   Hourly rounding in place  [facility] patient in [facility]     Problem: Skin Integrity:  Goal: Skin integrity will stabilize  Description  Skin integrity will stabilize  Outcome: Ongoing  Note:   Patient turns self often      Problem: Discharge Planning:  Goal: Patients continuum of care needs are met  Description  Patients continuum of care needs are met  Outcome: Ongoing  Note:   Discharge planning in place, plans go home      Problem: Falls - Risk of:  Goal: Will remain free from falls  Description  Will remain free from falls  Outcome: Ongoing  Note:   No falls this shift. Bed is locked and in lowest position. Pt was instructed on how to use call light and oriented to room. Call light and overhead table within reach. Will continue to monitor    Bed alarm on      Problem: Infection - Methicillin-Resistant Staphylococcus Aureus Infection:  Goal: Absence of methicillin-resistant Staphylococcus aureus infection  Description  Absence of methicillin-resistant Staphylococcus aureus infection  Outcome: Ongoing  Note:   Contact precautions in place   Care plan reviewed with patient and family. Patient and family verbalize understanding of the plan of care and contribute to goal setting.

## 2020-03-02 NOTE — PROGRESS NOTES
Hospitalist Progress Note      Patient:  Joan Shaw    Unit/Bed:4K-09/009-A  YOB: 1970  MRN: [id number]   Acct: [de-identified]   PCP: Idalmis Reed MD  Date of Admission: 2/27/2020    Assessment/Plan:    1. Spontaneous large right pneumothorax: Chest tube was placed at [facility] on 3/1/2020 reports persistent right pneumothorax with stable position of right-sided chest tube. Minimal right lower lobe atelectasis and/or infiltrate. Continue chest tube management and repeat CXR in the morning. Tentative plan to remove chest tube tomorrow.  CT surgery following, appreciate assistance. 2. Extensive subcutaneous emphysema: CT of the chest performed on 2/27 reported relatively severe subcutaneous emphysema on the right side of the chest laterally extending anteriorly and posteriorly.  Continue chest tube management, repeat CXR in the morning.  CT surgery following. 3. Hypokalemia: Resolved.  Current potassium 4.2.  4. Acute macrocytic anemia: Stable. Current H&H 10.2/33.3 with an MCV of 102. 5.  Probably secondary to above problems.  No overt signs of bleeding at this time.  B12 and folate within normal limits. Continue to monitor for changes, recheck CBC in the morning. 5. Remote smoker: Patient reports quitting smoking approximately 3 weeks ago. Latia Koch was educated on the importance of smoking cessation and was receptive to the education.  Currently denies NRT. Chief Complaint: Right-sided chest pain    Initial H and P:-    Oneal Woodwardmagaly a 25-year-old  female, reformed smoker, with a medical history of asthma and ptosis. Patient presented to [facility] with complaints of right-sided chest pain after she was initially seen at [facility]"" this morning. Latia Koch was initially seen at [address] [place] [address] for right-sided chest pain two spontaneous pneumothorax 2 weeks ago.  Patient reported that she had a chest tube at that time and presented today with complaints of right-sided chest pain started last evening after she was cleaning and stated that she felt air bubbles.  While in the ER, the patient complained of right lower chest wall pain that she rated a 10/10 on the pain scale.  She also complained of shortness of breath and stated that she used to smoke 1 pack/day for a long time but she quit 3 weeks ago because she wanted to breathe.  While in the ER, the patient denied any lightheadedness, dizziness, or nausea.  Patient reports that she tries not to cough because it increases her pain.  The hospitalist service was contacted for further evaluation, treatment, and management. Subjective (past 24 hours):   Patient sitting in bed watching TV at the time of the interview. No changes to report from yesterday, states that she is feeling great today and hopefully expecting to get her chest tube out tomorrow. Currently denies any chest pain, shortness of breath, nausea, vomiting, abdominal pain, diarrhea, constipation, urinary complaints, lightheadedness, dizziness, headaches, change in vision, fever, or chills. She was updated on the current plan of care and had no other needs or questions at this time. Past medical history, family history, social history and allergies reviewed again and is unchanged since admission. ROS (14 point review of systems completed. Pertinent positives noted. Otherwise ROS is negative) :  GENERAL: No fever,chills, or night sweats. SKIN: No lesions or rashes. HEAD: No headaches or recent injury. EYES: No acute changes in vision, no diplopia or blurred vision. EARS: No hearing loss, no tinnitus. NOSE/THROAT: No rhinorrhea or pharyngitis, no nasal drainage. NECK: No lumps or unusual neck stiffness. PULMONARY: Respirations easy and non-labored, no acute distress. CARDIAC: No chest pain, pressure, Negative for lower leg edema.   GI: Abdomen recognition technology. **      Final report electronically signed by Dr. Leopoldo Spencer on 3/1/2020 2:15 AM      XR CHEST PORTABLE   Final Result   . Small right pneumothorax visualized. 2. Right-sided chest tube present. Correlate with clinical adjustment or tube clamping. **This report has been created using voice recognition software. It may contain minor errors which are inherent in voice recognition technology. **      Final report electronically signed by Dr. Leopoldo Specner on 2/29/2020 6:14 AM      XR CHEST PORTABLE   Final Result   . 1. Visualized right-sided chest tube. No acute pneumothorax. 2. Coarse lung markings. 3. Prominent subcutaneous emphysema along the right thorax. **This report has been created using voice recognition software. It may contain minor errors which are inherent in voice recognition technology. **      Final report electronically signed by Dr. Leopoldo Spencer on 2/28/2020 4:49 AM      CT CHEST WO CONTRAST   Final Result   1. Large bore chest tube right side. Small less than 5% pneumothorax right side. 2. Extensive pneumomediastinum. Relatively severe subcutaneous emphysema on the right side of the chest laterally extending anteriorly and posteriorly. Mild bibasilar atelectasis/pneumonia. 3. Note that the large bore chest tube appears to extend into the lung parenchyma posteriorly with some mild adjacent atelectasis or possibly tiny amount of associated focal hemorrhage along the tube tract. **This report has been created using voice recognition software. It may contain minor errors which are inherent in voice recognition technology. **      Final report electronically signed by Dr. Rebeca Puente on 2/27/2020 2:01 PM      XR CHEST PORTABLE    (Results Pending)     Ct Chest Wo Contrast    Result Date: 2/27/2020  PROCEDURE: CT CHEST WO CONTRAST CLINICAL INFORMATION: pneumothorax COMPARISON: No prior study.  TECHNIQUE: Multiple axial 5 millimeter images of

## 2020-03-02 NOTE — CARE COORDINATION
Darrylkersdijherminia 77 day: 4  Location: [facility]09/009-A Reason for admit: Spontaneous pneumothorax [J93.83]   Procedure:   2/27 CT Chest  1. Large bore chest tube right side. Small less than 5% pneumothorax right side. 2. Extensive pneumomediastinum. Relatively severe subcutaneous emphysema on the right side of the chest laterally extending anteriorly and posteriorly. Mild bibasilar atelectasis/pneumonia. 3. Note that the large bore chest tube appears to extend into the lung parenchyma posteriorly with some mild adjacent atelectasis or possibly tiny amount of associated focal hemorrhage along the tube tract. 3/2 CXR  1. . Persistent right pneumothorax. 2. Redemonstration of right-sided chest tube. 3. Persistent subcutaneous emphysema along the right chest wall. Treatment Plan of Care: CTS plans likely chest tube removal in am (if worsening pneumothorax, plans SGY per notes) , right chest tube (water seal) output = 70 ml/24h; monitor.    Barriers to Discharge: plans home with family  PCP: Lidia Lauren MD  Readmission Risk Score: 8%  Patient Goals/Plan/Treatment Preferences: met with client, denied needs as plans home with daughter Zak as PTA

## 2020-03-02 NOTE — FLOWSHEET NOTE
03/02/20 1800   Chest Tube Right 28 Panamanian   Placement Date/Time: 02/27/20 (c)   Chest Tube Orientation: Right  Size (fr): 28 [place]ian  Chest Tube Drainage System: Suction   Suction Other (Comment)  (clamped)   Chest Tube Airleak No   Drainage Description Serosanguinous   Dressing Status Clean;Dry; Intact   Dressing Type Dry dressing   Site Assessment Clean;Dry; Intact   Surrounding Skin Crepitus     0800: chest tube to water seal-patient tolerated well   1800: clamped chest tube per order- patient tolerating well

## 2020-03-03 ENCOUNTER — APPOINTMENT (OUTPATIENT)
Dept: GENERAL RADIOLOGY | Age: 50
DRG: 201 | End: 2020-03-03
Attending: INTERNAL MEDICINE

## 2020-03-03 ENCOUNTER — APPOINTMENT (OUTPATIENT)
Dept: CT IMAGING | Age: 50
DRG: 201 | End: 2020-03-03
Attending: INTERNAL MEDICINE

## 2020-03-03 PROCEDURE — 6360000002 HC RX W HCPCS: Performed by: NURSE PRACTITIONER

## 2020-03-03 PROCEDURE — 6370000000 HC RX 637 (ALT 250 FOR IP): Performed by: NURSE PRACTITIONER

## 2020-03-03 PROCEDURE — 6360000002 HC RX W HCPCS

## 2020-03-03 PROCEDURE — 71045 X-RAY EXAM CHEST 1 VIEW: CPT

## 2020-03-03 PROCEDURE — 99231 SBSQ HOSP IP/OBS SF/LOW 25: CPT | Performed by: NURSE PRACTITIONER

## 2020-03-03 PROCEDURE — 2060000000 HC ICU INTERMEDIATE R&B

## 2020-03-03 PROCEDURE — 2709999900 HC NON-CHARGEABLE SUPPLY

## 2020-03-03 PROCEDURE — 71250 CT THORAX DX C-: CPT

## 2020-03-03 PROCEDURE — 2580000003 HC RX 258: Performed by: NURSE PRACTITIONER

## 2020-03-03 PROCEDURE — APPSS30 APP SPLIT SHARED TIME 16-30 MINUTES: Performed by: PHYSICIAN ASSISTANT

## 2020-03-03 PROCEDURE — C1894 INTRO/SHEATH, NON-LASER: HCPCS

## 2020-03-03 PROCEDURE — C1769 GUIDE WIRE: HCPCS

## 2020-03-03 PROCEDURE — C1729 CATH, DRAINAGE: HCPCS

## 2020-03-03 RX ADMIN — OXYCODONE HYDROCHLORIDE AND ACETAMINOPHEN 2 TABLET: 5; 325 TABLET ORAL at 20:52

## 2020-03-03 RX ADMIN — HYDROMORPHONE HYDROCHLORIDE 0.5 MG: 1 INJECTION, SOLUTION INTRAMUSCULAR; INTRAVENOUS; SUBCUTANEOUS at 14:08

## 2020-03-03 RX ADMIN — OXYCODONE HYDROCHLORIDE AND ACETAMINOPHEN 2 TABLET: 5; 325 TABLET ORAL at 12:31

## 2020-03-03 RX ADMIN — Medication 10 ML: at 20:51

## 2020-03-03 RX ADMIN — OXYCODONE HYDROCHLORIDE AND ACETAMINOPHEN 2 TABLET: 5; 325 TABLET ORAL at 02:25

## 2020-03-03 RX ADMIN — ONDANSETRON 4 MG: 2 INJECTION INTRAMUSCULAR; INTRAVENOUS at 14:08

## 2020-03-03 RX ADMIN — OXYCODONE HYDROCHLORIDE AND ACETAMINOPHEN 2 TABLET: 5; 325 TABLET ORAL at 06:57

## 2020-03-03 RX ADMIN — OXYCODONE HYDROCHLORIDE AND ACETAMINOPHEN 2 TABLET: 5; 325 TABLET ORAL at 16:45

## 2020-03-03 ASSESSMENT — PAIN DESCRIPTION - FREQUENCY
FREQUENCY: CONTINUOUS

## 2020-03-03 ASSESSMENT — PAIN DESCRIPTION - DIRECTION
RADIATING_TOWARDS: ALL OVER

## 2020-03-03 ASSESSMENT — PAIN DESCRIPTION - PAIN TYPE
TYPE: ACUTE PAIN

## 2020-03-03 ASSESSMENT — PAIN DESCRIPTION - ORIENTATION
ORIENTATION: RIGHT

## 2020-03-03 ASSESSMENT — PAIN DESCRIPTION - DESCRIPTORS
DESCRIPTORS: ACHING
DESCRIPTORS: ACHING;BURNING
DESCRIPTORS: ACHING
DESCRIPTORS: SORE
DESCRIPTORS: ACHING;BURNING

## 2020-03-03 ASSESSMENT — PAIN SCALES - GENERAL
PAINLEVEL_OUTOF10: 2
PAINLEVEL_OUTOF10: 8
PAINLEVEL_OUTOF10: 1
PAINLEVEL_OUTOF10: 7
PAINLEVEL_OUTOF10: 7
PAINLEVEL_OUTOF10: 2
PAINLEVEL_OUTOF10: 7
PAINLEVEL_OUTOF10: 8
PAINLEVEL_OUTOF10: 8
PAINLEVEL_OUTOF10: 7
PAINLEVEL_OUTOF10: 1
PAINLEVEL_OUTOF10: 7

## 2020-03-03 ASSESSMENT — PAIN DESCRIPTION - LOCATION
LOCATION: BACK;CHEST
LOCATION: BACK;CHEST
LOCATION: CHEST;BACK
LOCATION: BACK;CHEST
LOCATION: BACK

## 2020-03-03 ASSESSMENT — PAIN DESCRIPTION - ONSET
ONSET: ON-GOING

## 2020-03-03 ASSESSMENT — PAIN DESCRIPTION - PROGRESSION
CLINICAL_PROGRESSION: NOT CHANGED
CLINICAL_PROGRESSION: GRADUALLY IMPROVING

## 2020-03-03 NOTE — FLOWSHEET NOTE
Pt was alone at the time of the visit. She was dealing with pneumonia. She was not giving up but said that she will be fighting this illness. Prayer was appreciated. 03/03/20 0245   Encounter Summary   Services provided to: Patient   Referral/Consult From: Rounding   Continue Visiting Yes  (3/3 )   Complexity of Encounter Low   Length of Encounter 15 minutes   Routine   Type Initial   Assessment Approachable;Calm   Intervention [place];Prayer;Nurtured hope   Outcome Acceptance;Expressed gratitude;Encouraged; Hopeful;Receptive

## 2020-03-03 NOTE — CARE COORDINATION
Darrylkersdijherminia 77 day: 5  Location: [facility]09/009-A Reason for admit: Spontaneous pneumothorax [J93.83]   Procedure:   2/27 CT Chest  1. Large bore chest tube right side. Small less than 5% pneumothorax right side. 2. Extensive pneumomediastinum. Relatively severe subcutaneous emphysema on the right side of the chest laterally extending anteriorly and posteriorly. Mild bibasilar atelectasis/pneumonia. 3. Note that the large bore chest tube appears to extend into the lung parenchyma posteriorly with some mild adjacent atelectasis or possibly tiny amount of associated focal hemorrhage along the tube tract. 3/3 CXR  Stable 5-10% right apical pneumothorax. Stable position of the right chest tube. Treatment Plan of Care: CTS plans likely chest tube removal versus may need SGY after CXR reviewed, right chest tube (clamped) output = 40 ml/24h; monitor.    Barriers to Discharge: plans home with family  PCP: Kristen Leyva MD  Readmission Risk Score: 6%  Patient Goals/Plan/Treatment Preferences: met with client, denied needs as plans home with daughter Chip Rota as PTA

## 2020-03-03 NOTE — PROGRESS NOTES
CT/CV Surgery Progress Note    3/3/2020 8:33 AM  Surgeon:  Dr. Susana Lawson     Subjective:  Ms. Patsy Delarosa is resting comfortably in bed on RA. She is alert, and in no acute distress. Chest tube placed for spontaneous pneumothorax with history of 2 previous spontaneous pneumothorax. Chest tube output: 40 cc 24 hours. No air leak. Pt denies chest pressure, SOB, fever,chills, N/V/D. Vital Signs: /68   Pulse 81   Temp 98 °F (36.7 °C) (Oral)   Resp 18   Ht 5' 2\" (1.575 m)   Wt 111 lb 11.2 oz (50.7 kg)   SpO2 91%   BMI 20.43 kg/m²    Temp (24hrs), Av.1 °F (36.7 °C), Min:97.9 °F (36.6 °C), Max:98.5 °F (36.9 °C)     Imaging:  CXR: 3/3/20  Stable 5-10% right apical pneumothorax. Stable position of the right chest tube. 20 CT Chest  1. Large bore chest tube right side. Small less than 5% pneumothorax right side. 2. Extensive pneumomediastinum. Relatively severe subcutaneous emphysema on the right side of the chest laterally extending anteriorly and posteriorly. Mild bibasilar atelectasis/pneumonia. 3. Note that the large bore chest tube appears to extend into the lung parenchyma posteriorly with some mild adjacent atelectasis or possibly tiny amount of associated focal hemorrhage along the tube tract. Intake/Output Summary (Last 24 hours) at 3/3/2020 0833  Last data filed at 3/3/2020 8988  Gross per 24 hour   Intake 1940 ml   Output 3340 ml   Net -1400 ml     Scheduled Meds:    sodium chloride flush  10 mL Intravenous 2 times per day    nicotine  1 patch Transdermal Daily     ROS: All neg unless specifically mentioned in subjective section.      Exam:  General Appearance: alert ,conversing, in no acute distress  Cardiovascular: normal rate, regular rhythm, normal S1 and S2, no murmurs, rubs, clicks, or gallops  Pulmonary/Chest: Subcutaneous emphysema right chest.  Lungs clear to auscultation bilaterally- no wheezes, rales or rhonchi, normal air movement, no respiratory

## 2020-03-03 NOTE — PLAN OF CARE
Problem: Pain:  Goal: Pain level will decrease  Description  Pain level will decrease  3/2/2020 2214 by Remedios Torres RN  Outcome: Ongoing  Note:   Pain Assessment: 0-10  Pain Level: 6   Patient's Stated Pain Goal: 5   Is pain goal met at this time? No     Non-Pharmaceutical Pain Intervention(s): Repositioned, Rest    3/2/2020 1736 by Hazel Najera RN  Outcome: Ongoing  Note:   Pain Assessment: 0-10  Pain Level: 5   Patient's Stated Pain Goal: 5   Is pain goal met at this time? Yes     Non-Pharmaceutical Pain Intervention(s): Repositioned, Rest       Problem: Infection:  Goal: Will remain free from infection  Description  Will remain free from infection  3/2/2020 2214 by Remedios Torres RN  Outcome: Ongoing  Note:   No infection noted this shift. 3/2/2020 1736 by Hazel Najera RN  Outcome: Ongoing  Note:   Dressing changed daily on chest tube, clean dry and intact     Problem: Daily Care:  Goal: Daily care needs are met  Description  Daily care needs are met  3/2/2020 2214 by Remedios Torres RN  Outcome: Ongoing  Note:   Daily care is being met by patient and nurse. 3/2/2020 1736 by Hazel Najera RN  Outcome: Ongoing  Note:   Hourly rounding in place  [facility]     Problem: Skin Integrity:  Goal: Skin integrity will stabilize  Description  Skin integrity will stabilize  3/2/2020 2214 by Remedios Torres RN  Outcome: Ongoing  Note:   No skin issues this shift. 3/2/2020 1736 by Hazel Najera RN  Outcome: Ongoing  Note:   Patient turns self often      Problem: Discharge Planning:  Goal: Patients continuum of care needs are met  Description  Patients continuum of care needs are met  3/2/2020 2214 by Remedios Torres RN  Outcome: Ongoing  Note:   No discharge plans this shift.   3/2/2020 1736 by Hazel Najera RN  Outcome: Ongoing  Note:   Discharge planning in place, plans go home      Problem: Falls - Risk of:  Goal: Will remain free from falls  Description  Will

## 2020-03-03 NOTE — PLAN OF CARE
Problem: Pain:  Goal: Pain level will decrease  Description  Pain level will decrease  3/3/2020 0958 by Sneha Hannon RN  Outcome: Ongoing  Note:   Patient verbalizes pain is able to be controlled with prn pain medications, pain goal of 5 / 10 is achieved. Receiving PO PRN Percocet. Rest / reposition / splinting is also effective for pain control. 3/2/2020 2214 by Nirmala Medina RN  Outcome: Ongoing  Note:   Pain Assessment: 0-10  Pain Level: 6   Patient's Stated Pain Goal: 5   Is pain goal met at this time? No     Non-Pharmaceutical Pain Intervention(s): Repositioned, Rest       Problem: Daily Care:  Goal: Daily care needs are met  Description  Daily care needs are met  3/3/2020 0958 by Sneha Hannon RN  Outcome: Ongoing  Note:   Patient's daily care needs continue to be met. 3/2/2020 2214 by Nirmala Medina RN  Outcome: Ongoing  Note:   Daily care is being met by patient and nurse. Problem: Skin Integrity:  Goal: Skin integrity will stabilize  Description  Skin integrity will stabilize  3/3/2020 0958 by Sneha Hannon RN  Outcome: Ongoing  Note:   Patient's skin is appropriate for ethnicity, warm, and dry, with no new skin issues noted this shift. Mucous membranes are pink, moist, and intact. 3/2/2020 2214 by Nirmala Medina RN  Outcome: Ongoing  Note:   No skin issues this shift. Problem: Discharge Planning:  Goal: Patients continuum of care needs are met  Description  Patients continuum of care needs are met  3/3/2020 0958 by Sneha Hannon RN  Outcome: Ongoing  Note:   Patient's continuum of care needs continue to be met. 3/2/2020 2214 by Nirmala Medina RN  Outcome: Ongoing  Note:   No discharge plans this shift.      Problem: Falls - Risk of:  Goal: Will remain free from falls  Description  Will remain free from falls  3/3/2020 0958 by Sneha Hannon RN  Outcome: Ongoing  Note:   No falls noted this shift, patient uses call light appropriately and waits for

## 2020-03-03 NOTE — PROGRESS NOTES
syncope, seizures or syncope. HEMATOLOGIC:  No unusual bruising or bleeding. PSYCH: Denies any homicidal or suicidial ideations. Medications:  Reviewed    Infusion Medications   Scheduled Medications    sodium chloride flush  10 mL Intravenous 2 times per day    nicotine  1 patch Transdermal Daily     PRN Meds: magnesium hydroxide, sodium chloride flush, acetaminophen, acetaminophen, promethazine, ondansetron, potassium chloride **OR** potassium alternative oral replacement **OR** potassium chloride, magnesium sulfate, bisacodyl, HYDROmorphone **OR** HYDROmorphone, oxyCODONE-acetaminophen **OR** oxyCODONE-acetaminophen      Intake/Output Summary (Last 24 hours) at 3/3/2020 1259  Last data filed at 3/3/2020 1136  Gross per 24 hour   Intake 1940 ml   Output 3040 ml   Net -1100 ml       Diet:  DIET GENERAL;    Exam:  /67   Pulse 78   Temp 97.5 °F (36.4 °C) (Oral)   Resp 16   Ht 5' 2\" (1.575 m)   Wt 111 lb 11.2 oz (50.7 kg)   SpO2 92%   BMI 20.43 kg/m²     General appearance: Alert and appropriate, pleasant adult female.  No apparent distress, appears stated age and cooperative. HEENT: Pupils equal, round, and reactive to light. Conjunctivae/corneas clear. Neck: Supple, with full range of motion. No jugular venous distention. Trachea midline. Respiratory:  Normal respiratory effort. Clear to auscultation, bilaterally without Rales/Wheezes/Rhonchi. Cardiovascular: Regular rate and rhythm with normal S1/S2 without murmurs, rubs or gallops. Abdomen: Soft, non-tender, non-distended with normal bowel sounds. Musculoskeletal: Passive and active ROM x 4 extremities. Skin: Skin color, texture, turgor normal.  No rashes or lesions. Neurologic:  Neurovascularly intact without any focal sensory/motor deficits. Cranial nerves: II-XII intact, grossly non-focal.  Psychiatric: Alert and oriented to person, place, time, and situation.  Thought content appropriate, normal insight  Capillary Refill: Brisk,< 3 seconds   Peripheral Pulses: +2 palpable, equal bilaterally     Labs:   No results for input(s): WBC, HGB, HCT, PLT in the last 72 hours. No results for input(s): NA, K, CL, CO2, BUN, CREATININE, CALCIUM, PHOS in the last 72 hours. Invalid input(s): MAGNES  No results for input(s): AST, ALT, BILIDIR, BILITOT, ALKPHOS in the last 72 hours. No results for input(s): INR in the last 72 hours. No results for input(s): Zannie Pica in the last 72 hours. Microbiology:    Blood culture #1: No results found for: BC    Blood culture #2:No results found for: [place] [facility]    Organism:No results found for: ORG    No results found for: LABGRAM    MRSA culture only:No results found for: [facility]    Urine culture: No results found for: LABURIN    Respiratory culture: No results found for: CULTRESP    Aerobic and Anaerobic :  No results found for: LABAERO  No results found for: LABANAE    Urinalysis:    No results found for: Farrel Maclachlan, BACTERIA, RBCUA, BLOODU, SPECGRAV, GLUCOSEU    Radiology:  XR CHEST PORTABLE   Final Result   Stable right apical pneumothorax. **This report has been created using voice recognition software. It may contain minor errors which are inherent in voice recognition technology. **      Final report electronically signed by Dr. Rochelle Rendon on 3/3/2020 11:44 AM      XR CHEST PORTABLE   Final Result      Stable 5-10% right apical pneumothorax. Stable position of the right chest tube. **This report has been created using voice recognition software. It may contain minor errors which are inherent in voice recognition technology. **      Final report electronically signed by Dr. Olive Reno on 3/3/2020 5:58 AM      XR CHEST PORTABLE   Final Result   1.. Persistent right pneumothorax. 2. Redemonstration of right-sided chest tube. 3. Persistent subcutaneous emphysema along the right chest wall. **This report has been created using voice recognition software.  It may contain

## 2020-03-04 ENCOUNTER — APPOINTMENT (OUTPATIENT)
Dept: GENERAL RADIOLOGY | Age: 50
DRG: 201 | End: 2020-03-04
Attending: INTERNAL MEDICINE

## 2020-03-04 VITALS
HEIGHT: 62 IN | SYSTOLIC BLOOD PRESSURE: 100 MMHG | OXYGEN SATURATION: 91 % | BODY MASS INDEX: 20.5 KG/M2 | RESPIRATION RATE: 18 BRPM | HEART RATE: 101 BPM | WEIGHT: 111.4 LBS | TEMPERATURE: 98.1 F | DIASTOLIC BLOOD PRESSURE: 60 MMHG

## 2020-03-04 PROCEDURE — 2580000003 HC RX 258: Performed by: NURSE PRACTITIONER

## 2020-03-04 PROCEDURE — 6370000000 HC RX 637 (ALT 250 FOR IP): Performed by: NURSE PRACTITIONER

## 2020-03-04 PROCEDURE — 71046 X-RAY EXAM CHEST 2 VIEWS: CPT

## 2020-03-04 PROCEDURE — 99239 HOSP IP/OBS DSCHRG MGMT >30: CPT | Performed by: INTERNAL MEDICINE

## 2020-03-04 PROCEDURE — APPSS30 APP SPLIT SHARED TIME 16-30 MINUTES: Performed by: PHYSICIAN ASSISTANT

## 2020-03-04 RX ORDER — IBUPROFEN 200 MG
400 TABLET ORAL EVERY 6 HOURS PRN
Qty: 120 TABLET | Refills: 0 | COMMUNITY
Start: 2020-03-04

## 2020-03-04 RX ORDER — HYDROCODONE BITARTRATE AND ACETAMINOPHEN 5; 325 MG/1; MG/1
1 TABLET ORAL EVERY 6 HOURS PRN
Qty: 18 TABLET | Refills: 0 | Status: SHIPPED | OUTPATIENT
Start: 2020-03-04 | End: 2020-03-11

## 2020-03-04 RX ADMIN — Medication 10 ML: at 08:43

## 2020-03-04 RX ADMIN — OXYCODONE HYDROCHLORIDE AND ACETAMINOPHEN 2 TABLET: 5; 325 TABLET ORAL at 02:01

## 2020-03-04 ASSESSMENT — PAIN SCALES - GENERAL
PAINLEVEL_OUTOF10: 8
PAINLEVEL_OUTOF10: 7
PAINLEVEL_OUTOF10: 0

## 2020-03-04 ASSESSMENT — PAIN DESCRIPTION - ONSET: ONSET: ON-GOING

## 2020-03-04 ASSESSMENT — PAIN DESCRIPTION - PROGRESSION
CLINICAL_PROGRESSION: GRADUALLY IMPROVING
CLINICAL_PROGRESSION: NOT CHANGED
CLINICAL_PROGRESSION: GRADUALLY IMPROVING

## 2020-03-04 ASSESSMENT — PAIN - FUNCTIONAL ASSESSMENT: PAIN_FUNCTIONAL_ASSESSMENT: PREVENTS OR INTERFERES SOME ACTIVE ACTIVITIES AND ADLS

## 2020-03-04 ASSESSMENT — PAIN DESCRIPTION - PAIN TYPE: TYPE: ACUTE PAIN

## 2020-03-04 ASSESSMENT — PAIN DESCRIPTION - ORIENTATION: ORIENTATION: RIGHT

## 2020-03-04 ASSESSMENT — PAIN DESCRIPTION - DESCRIPTORS: DESCRIPTORS: SORE;SPASM

## 2020-03-04 ASSESSMENT — PAIN DESCRIPTION - LOCATION: LOCATION: BACK

## 2020-03-04 ASSESSMENT — PAIN DESCRIPTION - FREQUENCY: FREQUENCY: CONTINUOUS

## 2020-03-04 NOTE — CARE COORDINATION
3/4/20, 8:56 AM    Patient goals/plan/ treatment preferences discussed by  and . Patient goals/plan/ treatment preferences reviewed with patient/ family. Patient/ family verbalize understanding of discharge plan and are in agreement with goal/plan/treatment preferences. Understanding was demonstrated using the teach back method. AVS provided by RN at time of discharge, which includes all necessary medical information pertaining to the patients current course of illness, treatment, post-discharge goals of care, and treatment preferences.

## 2020-03-04 NOTE — DISCHARGE SUMMARY
convenience and continuity at follow-up the following most recent labs are provided:      CBC:    Lab Results   Component Value Date    WBC 7.2 02/29/2020    HGB 10.2 02/29/2020    HCT 33.3 02/29/2020     02/29/2020       Renal:    Lab Results   Component Value Date     02/29/2020    K 4.2 02/29/2020    K 3.5 02/28/2020     02/29/2020    CO2 25 02/29/2020    BUN 15 02/29/2020    CREATININE 0.4 02/29/2020    CALCIUM 8.3 02/29/2020         Significant Diagnostic Studies    Radiology:   XR CHEST STANDARD (2 VW)   Final Result      Interval removal of the right chest tube. Stable small, 5-10% right pneumothorax. No pneumonia. Minimal bilateral pleural effusions. **This report has been created using voice recognition software. It may contain minor errors which are inherent in voice recognition technology. **      Final report electronically signed by Dr. Annabel Cheadle on 3/4/2020 7:03 AM      CT CHEST WO CONTRAST   Final Result   1. There is a small right apical pneumothorax demonstrated. However, this is of insufficient size for chest tube placement. The small right apical pneumothorax is subjacent to the clavicle. Therefore, chest tube placement was not performed. Recommend    continued radiographic follow-up. **This report has been created using voice recognition software. It may contain minor errors which are inherent in voice recognition technology. **      Final report electronically signed by Dr. William Omalley on 3/3/2020 3:24 PM      XR CHEST PORTABLE   Final Result   Stable right apical pneumothorax. **This report has been created using voice recognition software. It may contain minor errors which are inherent in voice recognition technology. **      Final report electronically signed by Dr. Lanetta Boast on 3/3/2020 11:44 AM      XR CHEST PORTABLE   Final Result      Stable 5-10% right apical pneumothorax. Stable position of the right chest tube.       **This review of medications and discharge plan. Signed: Thank you Louie Hill MD for the opportunity to be involved in this patient's care.     Electronically signed by Landon Raza MD on 3/4/2020 at 9:34 AM

## 2020-03-04 NOTE — PROGRESS NOTES
CT/CV Surgery Progress Note    3/4/2020 7:29 AM  Surgeon:  Dr. Monika Friedman     Subjective:  Ms. Adrián Nugent is resting comfortably in bed on RA. She is alert, and in no acute distress. Chest tube removed yesterday. Pt denies chest pressure, SOB, fever,chills, N/V/D. Vital Signs: BP (!) 93/52   Pulse 83   Temp 97.9 °F (36.6 °C) (Oral)   Resp 18   Ht 5' 2\" (1.575 m)   Wt 111 lb 6.4 oz (50.5 kg)   SpO2 92%   BMI 20.38 kg/m²    Temp (24hrs), Av °F (36.7 °C), Min:97.5 °F (36.4 °C), Max:98.3 °F (36.8 °C)     Imaging:  CXR: 3/4/20  Interval removal of the right chest tube. Stable small, 5-10% right pneumothorax. No pneumonia. Minimal bilateral pleural effusions. 20 CT Chest  1. Large bore chest tube right side. Small less than 5% pneumothorax right side. 2. Extensive pneumomediastinum. Relatively severe subcutaneous emphysema on the right side of the chest laterally extending anteriorly and posteriorly. Mild bibasilar atelectasis/pneumonia. 3. Note that the large bore chest tube appears to extend into the lung parenchyma posteriorly with some mild adjacent atelectasis or possibly tiny amount of associated focal hemorrhage along the tube tract. Intake/Output Summary (Last 24 hours) at 3/4/2020 07  Last data filed at 3/4/2020 0319  Gross per 24 hour   Intake 1330 ml   Output 1550 ml   Net -220 ml     Scheduled Meds:    sodium chloride flush  10 mL Intravenous 2 times per day    nicotine  1 patch Transdermal Daily     ROS: All neg unless specifically mentioned in subjective section.      Exam:  General Appearance: alert ,conversing, in no acute distress  Cardiovascular: normal rate, regular rhythm, normal S1 and S2, no murmurs, rubs, clicks, or gallops  Pulmonary/Chest: Subcutaneous emphysema right chest.  Lungs clear to auscultation bilaterally- no wheezes, rales or rhonchi, normal air movement, no respiratory distress  Neurological: alert, oriented, normal speech, no focal findings or

## 2020-03-04 NOTE — PROGRESS NOTES
CLINICAL PHARMACY: DISCHARGE MED RECONCILIATION/REVIEW    [facility]) Select Patient?: No  Total # of Interventions Recommended: 1 - New Order #: 1   -   Total # Interventions Accepted: 1  Intervention Severity:   - Level 1 Intervention Present?: No   - Level 2 #: 0   - Level 3 #: 1   Time Spent (min): 15

## 2020-11-04 NOTE — PROGRESS NOTES
Pulled two L. Wrist INT due to impatency. L. Wrist INT replaced by ARNOL Ortiz RN. Patient tolerated procedure well.    Jamal Morales, [facility], SN. Quality 110: Preventive Care And Screening: Influenza Immunization: Influenza Immunization previously received during influenza season Detail Level: Detailed

## 2024-03-02 ENCOUNTER — APPOINTMENT (OUTPATIENT)
Dept: CT IMAGING | Age: 54
End: 2024-03-02
Payer: COMMERCIAL

## 2024-03-02 ENCOUNTER — APPOINTMENT (OUTPATIENT)
Dept: GENERAL RADIOLOGY | Age: 54
End: 2024-03-02
Payer: COMMERCIAL

## 2024-03-02 ENCOUNTER — HOSPITAL ENCOUNTER (EMERGENCY)
Age: 54
Discharge: HOME | End: 2024-03-02
Attending: EMERGENCY MEDICINE
Payer: COMMERCIAL

## 2024-03-02 VITALS
RESPIRATION RATE: 18 BRPM | DIASTOLIC BLOOD PRESSURE: 77 MMHG | HEART RATE: 73 BPM | OXYGEN SATURATION: 100 % | SYSTOLIC BLOOD PRESSURE: 141 MMHG | TEMPERATURE: 98.2 F

## 2024-03-02 DIAGNOSIS — S96.912A STRAIN OF LEFT FOOT, INITIAL ENCOUNTER: Primary | ICD-10-CM

## 2024-03-02 PROCEDURE — 99284 EMERGENCY DEPT VISIT MOD MDM: CPT

## 2024-03-02 PROCEDURE — 73630 X-RAY EXAM OF FOOT: CPT

## 2024-03-02 PROCEDURE — 73700 CT LOWER EXTREMITY W/O DYE: CPT

## 2024-03-02 PROCEDURE — 6370000000 HC RX 637 (ALT 250 FOR IP): Performed by: STUDENT IN AN ORGANIZED HEALTH CARE EDUCATION/TRAINING PROGRAM

## 2024-03-02 RX ORDER — IBUPROFEN 200 MG
400 TABLET ORAL ONCE
Status: COMPLETED | OUTPATIENT
Start: 2024-03-02 | End: 2024-03-02

## 2024-03-02 RX ORDER — HYDROCODONE BITARTRATE AND ACETAMINOPHEN 5; 325 MG/1; MG/1
1 TABLET ORAL ONCE
Status: COMPLETED | OUTPATIENT
Start: 2024-03-02 | End: 2024-03-02

## 2024-03-02 RX ADMIN — HYDROCODONE BITARTRATE AND ACETAMINOPHEN 1 TABLET: 5; 325 TABLET ORAL at 17:56

## 2024-03-02 RX ADMIN — IBUPROFEN 400 MG: 200 TABLET, FILM COATED ORAL at 17:56

## 2024-03-02 ASSESSMENT — ENCOUNTER SYMPTOMS
SHORTNESS OF BREATH: 0
RESPIRATORY NEGATIVE: 1
CHEST TIGHTNESS: 0
NAUSEA: 0
GASTROINTESTINAL NEGATIVE: 1
ABDOMINAL PAIN: 0
ABDOMINAL DISTENTION: 0
VOMITING: 0
EYES NEGATIVE: 1

## 2024-03-02 ASSESSMENT — PAIN DESCRIPTION - LOCATION
LOCATION: FOOT
LOCATION: FOOT

## 2024-03-02 ASSESSMENT — PAIN SCALES - GENERAL
PAINLEVEL_OUTOF10: 7
PAINLEVEL_OUTOF10: 6

## 2024-03-02 ASSESSMENT — PAIN DESCRIPTION - ORIENTATION: ORIENTATION: LEFT

## 2024-03-02 ASSESSMENT — PAIN - FUNCTIONAL ASSESSMENT: PAIN_FUNCTIONAL_ASSESSMENT: 0-10

## 2024-03-02 NOTE — ED PROVIDER NOTES
[facility] EMERGENCY DEPT  EMERGENCY DEPARTMENT ENCOUNTER          Pt Name: Janny Gómez  MRN: [id number]  Birthdate 1970  Date of evaluation: 3/2/2024  Physician: Alejandro Hauser MD  Supervising Attending Physician: Christiane att. providers found       CHIEF COMPLAINT       Chief Complaint   Patient presents with    Foot Pain         HISTORY OF PRESENT ILLNESS    HPI  Janny Gómez is a 53 y.o. female who presents to the emergency department from home, by private vehicle for evaluation of pain left foot.  Patient reports he noted sudden onset of pain in the top of her left foot yesterday around noon.  There is no inciting injury.  Left top of her foot she states has since been swollen with throbbing pain rated 6-7/10 pointing to the top of her foot around third and fourth digits of her left foot.  States it is worse with walking.  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    The patient has no other acute complaints at this time.      REVIEW OF SYSTEMS   Review of Systems   Constitutional: Negative.  Negative for activity change, chills, fatigue and fever.   HENT: Negative.     Eyes: Negative.    Respiratory: Negative.  Negative for chest tightness and shortness of breath.    Cardiovascular: Negative.    Gastrointestinal: Negative.  Negative for abdominal distention, abdominal pain, nausea and vomiting.   Genitourinary: Negative.    Musculoskeletal:  Positive for gait problem.        Left foot pain and swelling   Skin: Negative.    Psychiatric/Behavioral: Negative.           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 There is no height or weight on file to calculate BMI.     Additional Vital Signs:  Vitals:    03/02/24 1756   BP:    Pulse:    Resp: 18   Temp:    SpO2:        Physical Exam  Constitutional:       General: She is not in acute distress.     Appearance: Normal appearance. She is not ill-appearing.   HENT:      Head: Normocephalic.      Nose: No congestion.      Mouth/Throat:      Mouth: Mucous membranes are moist.   Eyes:      Pupils: Pupils are equal, round, and reactive to light.   Cardiovascular:      Rate and Rhythm: Normal rate and regular rhythm.      Pulses: Normal pulses.      Heart sounds: Normal heart sounds.   Pulmonary:      Effort: Pulmonary effort is normal. No respiratory distress.      Breath sounds: Normal breath sounds. No stridor.   Abdominal:      General: Abdomen is flat.      Palpations: Abdomen is soft.   Musculoskeletal:         General: Swelling (top of left foot) and tenderness (left 2nd-3rd metatarsalse TTP) present.      Cervical back: Normal range of motion and neck supple.   Skin:     General: Skin is warm.      Capillary Refill: Capillary refill takes less than 2 seconds.   Neurological:      General: No focal deficit present.      Mental Status: She is alert and oriented to person, place, and time.   Psychiatric:         Mood and Affect: Mood normal.         Behavior: Behavior normal.         ED RESULTS   Laboratory results (none if blank):  Labs Reviewed - No data to display  All laboratory results are individually reviewed and interpreted by me in the clinical context of this patient.  See ED course below for results interpretation if applicable.  (A negative COVID-19 test should be interpreted as COVID no longer suspected unless otherwise noted in this encounter documentation note)  (Any cultures that may have been sent were not resulted at the time of this patient ED visit)      Radiologic

## 2024-03-02 NOTE — ED PROVIDER NOTES
patient. Within the department, the pt was treated with ***. I observed the pt's condition to *** during the duration of their stay. I explained my proposed course of treatment to the pt, and they were amenable to my decision. They were discharged home, and they will return to the ED if their symptoms become more severein nature, or otherwise change.    Medical Decision-Making:       FINAL IMPRESSION     No diagnosis found.        DISPOSITION/PLAN  PATIENT REFERRED TO:  No follow-up provider specified.  DISCHARGE MEDICATIONS:  New Prescriptions    No medications on file         (Please note that portions of this note were completed with a voice recognition program and electronically transcribed.  Efforts were madeto edit the dictations but occasionally words are mis-transcribed . The transcription may contain errors not detected in proofreading.  This transcription was electronically signed.)        Javier Paredes MD  Attending Emergency Physician

## 2024-03-02 NOTE — ED TRIAGE NOTES
Presents to ED with c/o left foot pain that started yesterday. Denies injury. Patient alert and oriented. Respirations easy and unlabored.

## 2024-03-02 NOTE — DISCHARGE INSTRUCTIONS
Find well supportive shoes.  Try to rest and elevate foot when able.  May ice 20 minutes on 1 hour off.  May also try compression for support and comfort.  May continue with ibuprofen every 4-6 hours for pain and swelling.

## 2024-03-14 ENCOUNTER — HOSPITAL ENCOUNTER (EMERGENCY)
Age: 54
Discharge: HOME | End: 2024-03-14
Payer: COMMERCIAL

## 2024-03-14 VITALS
SYSTOLIC BLOOD PRESSURE: 140 MMHG | OXYGEN SATURATION: 95 % | HEART RATE: 95 BPM | DIASTOLIC BLOOD PRESSURE: 90 MMHG | RESPIRATION RATE: 16 BRPM | TEMPERATURE: 98.2 F

## 2024-03-14 DIAGNOSIS — S92.902D CLOSED FRACTURE OF LEFT FOOT WITH ROUTINE HEALING, SUBSEQUENT ENCOUNTER: ICD-10-CM

## 2024-03-14 DIAGNOSIS — Z47.89 AFTERCARE FOR CAST OR SPLINT CHECK OR CHANGE: Primary | ICD-10-CM

## 2024-03-14 PROCEDURE — 29515 APPLICATION SHORT LEG SPLINT: CPT

## 2024-03-14 PROCEDURE — 99282 EMERGENCY DEPT VISIT SF MDM: CPT

## 2024-03-14 ASSESSMENT — PAIN SCALES - GENERAL: PAINLEVEL_OUTOF10: 7

## 2024-03-14 ASSESSMENT — PAIN DESCRIPTION - LOCATION: LOCATION: FOOT

## 2024-03-14 ASSESSMENT — PAIN DESCRIPTION - DESCRIPTORS: DESCRIPTORS: ACHING

## 2024-03-14 ASSESSMENT — PAIN - FUNCTIONAL ASSESSMENT: PAIN_FUNCTIONAL_ASSESSMENT: 0-10

## 2024-03-14 ASSESSMENT — PAIN DESCRIPTION - ORIENTATION: ORIENTATION: LEFT

## 2024-03-14 NOTE — ED TRIAGE NOTES
Patient presents to ED with chief complaint of foot injury 2 weeks ago. Patient had an xray done, but they were undecided on whether it was broken or not. Patient states that pain has gotten worse. Patient was seen at [facility] yesterday and they confirmed a break. Patient was given a boot and she states she can not walk in it. Patient states she just needs a solution to what to wear. Patient resting in bed. Respirations easy and unlabored. No distress noted. Call light within reach.

## 2024-03-14 NOTE — ED PROVIDER NOTES
[facility] EMERGENCY DEPT      EMERGENCY MEDICINE     Pt Name: Janny Gómez  MRN: [id number]  Birthdate 1970  Date of evaluation: 3/14/2024  Provider: ZENAIDA Murillo CNP    CHIEF COMPLAINT       Chief Complaint   Patient presents with    Foot Injury     HISTORY OF PRESENT ILLNESS   Janny Gómez is a pleasant 53 y.o. female who presents to the emergency department with sister from home with c/o foot injury. [facility] placed the patient in a boot for a fourth midshaft metatarsal fracture yesterday. Since being in the boot, she has fallen four times because it is \"bulky\". She does not want crutches, and is requesting a cast.       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\"    History of blood transfusion     Ptosis        Patient Active Problem List   Diagnosis Code    Spontaneous pneumothorax J93.83     SURGICAL HISTORY       Past Surgical History:   Procedure Laterality Date    CHOLECYSTECTOMY      EYE SURGERY      several eyelid surgeries       CURRENT MEDICATIONS       Previous Medications    ALBUTEROL SULFATE (VENTOLIN HFA IN)    Inhale 2 puffs into the lungs every 6 hours as needed     IBUPROFEN (ADVIL) 200 MG TABLET    Take 2 tablets by mouth every 6 hours as needed for Pain Take with food    VITAMIN B-6 (PYRIDOXINE) 100 MG TABLET    Take 100 mg by mouth daily       ALLERGIES     is allergic to codeine, penicillins, and food.    FAMILY HISTORY     She indicated that her mother is . She indicated that her father is . She indicated that her paternal grandfather is . She indicated that her paternal uncle is .       SOCIAL HISTORY       Social History     Tobacco Use    Smoking status: Former     Current packs/day: 0.00     Average packs/day: 1 pack/day for 32.0 years (32.0 ttl pk-yrs)

## 2024-03-15 NOTE — DISCHARGE INSTRUCTIONS
You must use crutches at all times with the splint.  Do not take it off until you see orthopedics.  Call them in the morning for follow-up
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
## 2025-08-05 ENCOUNTER — OFFICE VISIT (OUTPATIENT)
Dept: FAMILY MEDICINE CLINIC | Age: 55
End: 2025-08-05
Payer: COMMERCIAL

## 2025-08-05 ENCOUNTER — LAB (OUTPATIENT)
Dept: LAB | Age: 55
End: 2025-08-05

## 2025-08-05 VITALS
HEIGHT: 62 IN | BODY MASS INDEX: 19.02 KG/M2 | DIASTOLIC BLOOD PRESSURE: 90 MMHG | TEMPERATURE: 97.6 F | WEIGHT: 103.38 LBS | SYSTOLIC BLOOD PRESSURE: 140 MMHG

## 2025-08-05 DIAGNOSIS — M53.3 PAIN OF LEFT SACROILIAC JOINT: ICD-10-CM

## 2025-08-05 DIAGNOSIS — Z13.220 NEED FOR LIPID SCREENING: ICD-10-CM

## 2025-08-05 DIAGNOSIS — Z11.4 SCREENING FOR HUMAN IMMUNODEFICIENCY VIRUS: ICD-10-CM

## 2025-08-05 DIAGNOSIS — J45.909 PERSISTENT ASTHMA WITHOUT COMPLICATION, UNSPECIFIED ASTHMA SEVERITY: ICD-10-CM

## 2025-08-05 DIAGNOSIS — F32.1 CURRENT MODERATE EPISODE OF MAJOR DEPRESSIVE DISORDER, UNSPECIFIED WHETHER RECURRENT (HCC): ICD-10-CM

## 2025-08-05 DIAGNOSIS — M54.32 SCIATICA OF LEFT SIDE: ICD-10-CM

## 2025-08-05 DIAGNOSIS — J30.9 ALLERGIC RHINITIS, UNSPECIFIED SEASONALITY, UNSPECIFIED TRIGGER: ICD-10-CM

## 2025-08-05 DIAGNOSIS — Z76.89 ENCOUNTER TO ESTABLISH CARE WITH NEW PROVIDER: ICD-10-CM

## 2025-08-05 DIAGNOSIS — Z11.59 NEED FOR HEPATITIS C SCREENING TEST: ICD-10-CM

## 2025-08-05 DIAGNOSIS — Z87.891 PERSONAL HISTORY OF TOBACCO USE: ICD-10-CM

## 2025-08-05 DIAGNOSIS — F32.1 CURRENT MODERATE EPISODE OF MAJOR DEPRESSIVE DISORDER, UNSPECIFIED WHETHER RECURRENT (HCC): Primary | ICD-10-CM

## 2025-08-05 DIAGNOSIS — Z12.31 BREAST CANCER SCREENING BY MAMMOGRAM: ICD-10-CM

## 2025-08-05 LAB
ALBUMIN SERPL BCG-MCNC: 4.5 G/DL (ref 3.4–4.9)
ALP SERPL-CCNC: 85 U/L (ref 38–126)
ALT SERPL W/O P-5'-P-CCNC: 13 U/L (ref 10–35)
ANION GAP SERPL CALC-SCNC: 10 MEQ/L (ref 8–16)
AST SERPL-CCNC: 21 U/L (ref 10–35)
BASOPHILS ABSOLUTE: 0.1 THOU/MM3 (ref 0–0.1)
BASOPHILS NFR BLD AUTO: 0.7 %
BILIRUB SERPL-MCNC: < 0.2 MG/DL (ref 0.3–1.2)
BUN SERPL-MCNC: 23 MG/DL (ref 8–23)
CALCIUM SERPL-MCNC: 9.8 MG/DL (ref 8.6–10)
CHLORIDE SERPL-SCNC: 106 MEQ/L (ref 98–111)
CHOLEST SERPL-MCNC: 213 MG/DL (ref 100–199)
CO2 SERPL-SCNC: 24 MEQ/L (ref 22–29)
CREAT SERPL-MCNC: 0.7 MG/DL (ref 0.5–0.9)
DEPRECATED RDW RBC AUTO: 49.6 FL (ref 35–45)
EOSINOPHIL NFR BLD AUTO: 2.7 %
EOSINOPHILS ABSOLUTE: 0.3 THOU/MM3 (ref 0–0.4)
ERYTHROCYTE [DISTWIDTH] IN BLOOD BY AUTOMATED COUNT: 14 % (ref 11.5–14.5)
GFR SERPL CREATININE-BSD FRML MDRD: > 90 ML/MIN/1.73M2
GLUCOSE SERPL-MCNC: 83 MG/DL (ref 74–109)
HCT VFR BLD AUTO: 43.4 % (ref 37–47)
HCV IGG SERPL QL IA: NONREACTIVE
HDLC SERPL-MCNC: 89 MG/DL
HGB BLD-MCNC: 14.3 GM/DL (ref 12–16)
HIV 1+2 AB+HIV1 P24 AG SERPL QL IA: NORMAL
IMM GRANULOCYTES # BLD AUTO: 0.02 THOU/MM3 (ref 0–0.07)
IMM GRANULOCYTES NFR BLD AUTO: 0.2 %
LDLC SERPL CALC-MCNC: 80 MG/DL
LYMPHOCYTES ABSOLUTE: 3.3 THOU/MM3 (ref 1–4.8)
LYMPHOCYTES NFR BLD AUTO: 32 %
MCH RBC QN AUTO: 31.4 PG (ref 26–33)
MCHC RBC AUTO-ENTMCNC: 32.9 GM/DL (ref 32.2–35.5)
MCV RBC AUTO: 95.4 FL (ref 81–99)
MONOCYTES ABSOLUTE: 0.6 THOU/MM3 (ref 0.4–1.3)
MONOCYTES NFR BLD AUTO: 5.9 %
NEUTROPHILS ABSOLUTE: 6 THOU/MM3 (ref 1.8–7.7)
NEUTROPHILS NFR BLD AUTO: 58.5 %
NRBC BLD AUTO-RTO: 0 /100 WBC
PLATELET # BLD AUTO: 294 THOU/MM3 (ref 130–400)
PMV BLD AUTO: 10 FL (ref 9.4–12.4)
POTASSIUM SERPL-SCNC: 3.5 MEQ/L (ref 3.5–5.2)
PROT SERPL-MCNC: 7.4 G/DL (ref 6.4–8.3)
RBC # BLD AUTO: 4.55 MILL/MM3 (ref 4.2–5.4)
SODIUM SERPL-SCNC: 140 MEQ/L (ref 135–145)
T4 FREE SERPL-MCNC: 0.9 NG/DL (ref 0.92–1.68)
TRIGL SERPL-MCNC: 222 MG/DL (ref 0–199)
TSH SERPL DL<=0.05 MIU/L-ACNC: 2.58 UIU/ML (ref 0.27–4.2)
WBC # BLD AUTO: 10.3 THOU/MM3 (ref 4.8–10.8)

## 2025-08-05 PROCEDURE — 4004F PT TOBACCO SCREEN RCVD TLK: CPT

## 2025-08-05 PROCEDURE — G8420 CALC BMI NORM PARAMETERS: HCPCS

## 2025-08-05 PROCEDURE — 3017F COLORECTAL CA SCREEN DOC REV: CPT

## 2025-08-05 PROCEDURE — G8427 DOCREV CUR MEDS BY ELIG CLIN: HCPCS

## 2025-08-05 PROCEDURE — 99204 OFFICE O/P NEW MOD 45 MIN: CPT

## 2025-08-05 PROCEDURE — G0296 VISIT TO DETERM LDCT ELIG: HCPCS

## 2025-08-05 RX ORDER — ALBUTEROL SULFATE 90 UG/1
2 INHALANT RESPIRATORY (INHALATION) EVERY 6 HOURS PRN
Qty: 1 EACH | Refills: 1 | Status: SHIPPED | OUTPATIENT
Start: 2025-08-05

## 2025-08-05 RX ORDER — ESCITALOPRAM OXALATE 20 MG/1
TABLET ORAL
Qty: 30 TABLET | Refills: 0 | Status: SHIPPED | OUTPATIENT
Start: 2025-08-05 | End: 2025-09-04

## 2025-08-05 RX ORDER — TIZANIDINE 2 MG/1
2 TABLET ORAL 3 TIMES DAILY PRN
Qty: 30 TABLET | Refills: 0 | Status: SHIPPED | OUTPATIENT
Start: 2025-08-05

## 2025-08-05 RX ORDER — MULTIVITAMIN WITH IRON
500 TABLET ORAL DAILY
COMMUNITY

## 2025-08-05 SDOH — ECONOMIC STABILITY: FOOD INSECURITY: WITHIN THE PAST 12 MONTHS, THE FOOD YOU BOUGHT JUST DIDN'T LAST AND YOU DIDN'T HAVE MONEY TO GET MORE.: OFTEN TRUE

## 2025-08-05 SDOH — ECONOMIC STABILITY: FOOD INSECURITY: WITHIN THE PAST 12 MONTHS, YOU WORRIED THAT YOUR FOOD WOULD RUN OUT BEFORE YOU GOT MONEY TO BUY MORE.: OFTEN TRUE

## 2025-08-05 ASSESSMENT — ENCOUNTER SYMPTOMS
BACK PAIN: 1
COLOR CHANGE: 0
COUGH: 0
WHEEZING: 0
RHINORRHEA: 0
SORE THROAT: 0
TROUBLE SWALLOWING: 0
VOICE CHANGE: 0
DIARRHEA: 0
BLOOD IN STOOL: 0
PHOTOPHOBIA: 0
CONSTIPATION: 0
SHORTNESS OF BREATH: 0
ABDOMINAL PAIN: 0
SINUS PAIN: 0
VOMITING: 0
NAUSEA: 0
SINUS PRESSURE: 0

## 2025-08-05 ASSESSMENT — PATIENT HEALTH QUESTIONNAIRE - PHQ9
4. FEELING TIRED OR HAVING LITTLE ENERGY: NOT AT ALL
SUM OF ALL RESPONSES TO PHQ QUESTIONS 1-9: 12
8. MOVING OR SPEAKING SO SLOWLY THAT OTHER PEOPLE COULD HAVE NOTICED. OR THE OPPOSITE, BEING SO FIGETY OR RESTLESS THAT YOU HAVE BEEN MOVING AROUND A LOT MORE THAN USUAL: NOT AT ALL
SUM OF ALL RESPONSES TO PHQ QUESTIONS 1-9: 12
9. THOUGHTS THAT YOU WOULD BE BETTER OFF DEAD, OR OF HURTING YOURSELF: NOT AT ALL
7. TROUBLE CONCENTRATING ON THINGS, SUCH AS READING THE NEWSPAPER OR WATCHING TELEVISION: NEARLY EVERY DAY
2. FEELING DOWN, DEPRESSED OR HOPELESS: NEARLY EVERY DAY
SUM OF ALL RESPONSES TO PHQ QUESTIONS 1-9: 12
SUM OF ALL RESPONSES TO PHQ QUESTIONS 1-9: 12
1. LITTLE INTEREST OR PLEASURE IN DOING THINGS: NOT AT ALL
3. TROUBLE FALLING OR STAYING ASLEEP: NOT AT ALL
5. POOR APPETITE OR OVEREATING: NEARLY EVERY DAY
10. IF YOU CHECKED OFF ANY PROBLEMS, HOW DIFFICULT HAVE THESE PROBLEMS MADE IT FOR YOU TO DO YOUR WORK, TAKE CARE OF THINGS AT HOME, OR GET ALONG WITH OTHER PEOPLE: EXTREMELY DIFFICULT
6. FEELING BAD ABOUT YOURSELF - OR THAT YOU ARE A FAILURE OR HAVE LET YOURSELF OR YOUR FAMILY DOWN: NEARLY EVERY DAY

## 2025-08-06 ENCOUNTER — CARE COORDINATION (OUTPATIENT)
Dept: CARE COORDINATION | Age: 55
End: 2025-08-06

## 2025-08-07 ENCOUNTER — CARE COORDINATION (OUTPATIENT)
Dept: CARE COORDINATION | Age: 55
End: 2025-08-07

## 2025-08-07 ENCOUNTER — RESULTS FOLLOW-UP (OUTPATIENT)
Dept: FAMILY MEDICINE CLINIC | Age: 55
End: 2025-08-07

## 2025-08-11 ENCOUNTER — CARE COORDINATION (OUTPATIENT)
Dept: CARE COORDINATION | Age: 55
End: 2025-08-11

## 2025-08-13 ENCOUNTER — CARE COORDINATION (OUTPATIENT)
Dept: CARE COORDINATION | Age: 55
End: 2025-08-13

## 2025-08-18 ENCOUNTER — CARE COORDINATION (OUTPATIENT)
Dept: CARE COORDINATION | Age: 55
End: 2025-08-18

## 2025-08-19 ENCOUNTER — CARE COORDINATION (OUTPATIENT)
Dept: CARE COORDINATION | Age: 55
End: 2025-08-19

## 2025-08-25 ENCOUNTER — CARE COORDINATION (OUTPATIENT)
Dept: CARE COORDINATION | Age: 55
End: 2025-08-25

## 2025-08-27 ENCOUNTER — OFFICE VISIT (OUTPATIENT)
Dept: FAMILY MEDICINE CLINIC | Age: 55
End: 2025-08-27
Payer: COMMERCIAL

## 2025-08-27 VITALS
SYSTOLIC BLOOD PRESSURE: 128 MMHG | BODY MASS INDEX: 18.82 KG/M2 | DIASTOLIC BLOOD PRESSURE: 86 MMHG | WEIGHT: 104.25 LBS | OXYGEN SATURATION: 95 % | HEART RATE: 95 BPM | TEMPERATURE: 97.6 F

## 2025-08-27 DIAGNOSIS — M54.32 SCIATICA OF LEFT SIDE: Primary | ICD-10-CM

## 2025-08-27 DIAGNOSIS — M53.3 PAIN OF LEFT SACROILIAC JOINT: ICD-10-CM

## 2025-08-27 DIAGNOSIS — F32.1 CURRENT MODERATE EPISODE OF MAJOR DEPRESSIVE DISORDER, UNSPECIFIED WHETHER RECURRENT (HCC): ICD-10-CM

## 2025-08-27 PROCEDURE — G8427 DOCREV CUR MEDS BY ELIG CLIN: HCPCS

## 2025-08-27 PROCEDURE — 3017F COLORECTAL CA SCREEN DOC REV: CPT

## 2025-08-27 PROCEDURE — G8420 CALC BMI NORM PARAMETERS: HCPCS

## 2025-08-27 PROCEDURE — 4004F PT TOBACCO SCREEN RCVD TLK: CPT

## 2025-08-27 PROCEDURE — 99213 OFFICE O/P EST LOW 20 MIN: CPT

## 2025-08-27 RX ORDER — IBUPROFEN 600 MG/1
600 TABLET, FILM COATED ORAL 3 TIMES DAILY PRN
Qty: 30 TABLET | Refills: 0 | Status: SHIPPED | OUTPATIENT
Start: 2025-08-27

## 2025-08-27 RX ORDER — SENNOSIDES 8.6 MG
650 CAPSULE ORAL EVERY 8 HOURS PRN
Qty: 60 TABLET | Refills: 3 | Status: SHIPPED | OUTPATIENT
Start: 2025-08-27

## 2025-08-27 RX ORDER — HYDROCODONE BITARTRATE AND ACETAMINOPHEN 5; 325 MG/1; MG/1
1 TABLET ORAL EVERY 6 HOURS PRN
Qty: 10 TABLET | Refills: 0 | Status: SHIPPED | OUTPATIENT
Start: 2025-08-27 | End: 2025-08-30

## 2025-08-27 RX ORDER — LIDOCAINE 50 MG/G
1 PATCH TOPICAL DAILY
Qty: 10 PATCH | Refills: 0 | Status: SHIPPED | OUTPATIENT
Start: 2025-08-27 | End: 2025-09-06

## 2025-08-27 RX ORDER — ESCITALOPRAM OXALATE 20 MG/1
20 TABLET ORAL DAILY
Qty: 90 TABLET | Refills: 1 | Status: SHIPPED | OUTPATIENT
Start: 2025-08-27 | End: 2026-02-23

## 2025-08-28 ASSESSMENT — ENCOUNTER SYMPTOMS
SHORTNESS OF BREATH: 0
VOICE CHANGE: 0
CONSTIPATION: 0
SINUS PRESSURE: 0
PHOTOPHOBIA: 0
COUGH: 0
VOMITING: 0
SINUS PAIN: 0
ABDOMINAL PAIN: 0
NAUSEA: 0
TROUBLE SWALLOWING: 0
RHINORRHEA: 0
BACK PAIN: 1
WHEEZING: 0
DIARRHEA: 0
SORE THROAT: 0
BLOOD IN STOOL: 0